# Patient Record
Sex: MALE | Race: OTHER | Employment: FULL TIME | URBAN - NONMETROPOLITAN AREA
[De-identification: names, ages, dates, MRNs, and addresses within clinical notes are randomized per-mention and may not be internally consistent; named-entity substitution may affect disease eponyms.]

---

## 2017-06-28 ENCOUNTER — INITIAL CONSULT (OUTPATIENT)
Dept: SURGERY | Age: 47
End: 2017-06-28
Payer: MEDICAID

## 2017-06-28 VITALS
DIASTOLIC BLOOD PRESSURE: 86 MMHG | RESPIRATION RATE: 12 BRPM | WEIGHT: 166 LBS | BODY MASS INDEX: 23.24 KG/M2 | TEMPERATURE: 97.1 F | HEIGHT: 71 IN | SYSTOLIC BLOOD PRESSURE: 112 MMHG | HEART RATE: 62 BPM

## 2017-06-28 DIAGNOSIS — K59.00 CONSTIPATION, UNSPECIFIED CONSTIPATION TYPE: ICD-10-CM

## 2017-06-28 DIAGNOSIS — R10.31 GROIN PAIN, RIGHT: Primary | ICD-10-CM

## 2017-06-28 LAB
-: NORMAL
AMORPHOUS: NORMAL
BACTERIA: NORMAL
CASTS UA: NORMAL /LPF (ref 0–2)
CRYSTALS, UA: NORMAL /HPF
EPITHELIAL CELLS UA: NORMAL /HPF (ref 0–5)
MUCUS: NORMAL
OTHER OBSERVATIONS UA: NORMAL
RBC UA: NORMAL /HPF (ref 0–4)
RENAL EPITHELIAL, UA: NORMAL /HPF
TRICHOMONAS: NORMAL
WBC UA: NORMAL /HPF (ref 0–4)
YEAST: NORMAL

## 2017-06-28 PROCEDURE — 99204 OFFICE O/P NEW MOD 45 MIN: CPT | Performed by: SURGERY

## 2017-07-06 ENCOUNTER — TELEPHONE (OUTPATIENT)
Dept: SURGERY | Age: 47
End: 2017-07-06

## 2017-09-19 ENCOUNTER — OFFICE VISIT (OUTPATIENT)
Dept: PRIMARY CARE CLINIC | Age: 47
End: 2017-09-19

## 2017-09-19 VITALS
SYSTOLIC BLOOD PRESSURE: 124 MMHG | DIASTOLIC BLOOD PRESSURE: 88 MMHG | HEIGHT: 71 IN | WEIGHT: 166 LBS | OXYGEN SATURATION: 95 % | HEART RATE: 66 BPM | TEMPERATURE: 99.6 F | BODY MASS INDEX: 23.24 KG/M2

## 2017-09-19 DIAGNOSIS — K52.9 GASTROENTERITIS: Primary | ICD-10-CM

## 2017-09-19 PROCEDURE — 99202 OFFICE O/P NEW SF 15 MIN: CPT | Performed by: NURSE PRACTITIONER

## 2017-09-19 RX ORDER — ONDANSETRON 4 MG/1
4 TABLET, FILM COATED ORAL EVERY 8 HOURS PRN
Qty: 15 TABLET | Refills: 0 | Status: SHIPPED | OUTPATIENT
Start: 2017-09-19 | End: 2018-04-13

## 2017-09-19 ASSESSMENT — ENCOUNTER SYMPTOMS
NAUSEA: 1
CHANGE IN BOWEL HABIT: 1
RESPIRATORY NEGATIVE: 1
DIARRHEA: 1
ABDOMINAL PAIN: 1
VOMITING: 1

## 2018-04-13 ENCOUNTER — OFFICE VISIT (OUTPATIENT)
Dept: FAMILY MEDICINE CLINIC | Age: 48
End: 2018-04-13
Payer: COMMERCIAL

## 2018-04-13 VITALS
HEIGHT: 71 IN | WEIGHT: 177.2 LBS | RESPIRATION RATE: 16 BRPM | HEART RATE: 62 BPM | DIASTOLIC BLOOD PRESSURE: 78 MMHG | TEMPERATURE: 98.2 F | BODY MASS INDEX: 24.81 KG/M2 | SYSTOLIC BLOOD PRESSURE: 120 MMHG

## 2018-04-13 DIAGNOSIS — Z13.1 SCREENING FOR DIABETES MELLITUS (DM): ICD-10-CM

## 2018-04-13 DIAGNOSIS — M25.521 RIGHT ELBOW PAIN: Primary | ICD-10-CM

## 2018-04-13 DIAGNOSIS — M77.11 LATERAL EPICONDYLITIS OF RIGHT ELBOW: ICD-10-CM

## 2018-04-13 DIAGNOSIS — Z13.220 SCREENING CHOLESTEROL LEVEL: ICD-10-CM

## 2018-04-13 DIAGNOSIS — Z12.5 SCREENING FOR PROSTATE CANCER: ICD-10-CM

## 2018-04-13 PROCEDURE — 99204 OFFICE O/P NEW MOD 45 MIN: CPT | Performed by: NURSE PRACTITIONER

## 2018-04-13 RX ORDER — DICLOFENAC POTASSIUM 25 MG/1
CAPSULE, LIQUID FILLED ORAL
Qty: 60 CAPSULE | Refills: 0 | Status: SHIPPED | OUTPATIENT
Start: 2018-04-13 | End: 2019-03-25

## 2018-04-13 RX ORDER — DICLOFENAC POTASSIUM 25 MG/1
CAPSULE, LIQUID FILLED ORAL
Qty: 60 CAPSULE | Refills: 0 | Status: SHIPPED | OUTPATIENT
Start: 2018-04-13 | End: 2018-04-13 | Stop reason: SDUPTHER

## 2018-04-13 RX ORDER — METHYLPREDNISOLONE 4 MG/1
TABLET ORAL
Qty: 1 KIT | Refills: 0 | Status: SHIPPED | OUTPATIENT
Start: 2018-04-13 | End: 2018-04-19

## 2018-04-13 RX ORDER — METHYLPREDNISOLONE 4 MG/1
TABLET ORAL
Qty: 1 KIT | Refills: 0 | Status: SHIPPED | OUTPATIENT
Start: 2018-04-13 | End: 2018-04-13 | Stop reason: SDUPTHER

## 2018-04-13 ASSESSMENT — ENCOUNTER SYMPTOMS
ABDOMINAL PAIN: 0
SHORTNESS OF BREATH: 0
NAUSEA: 0
COUGH: 0

## 2018-04-13 ASSESSMENT — PATIENT HEALTH QUESTIONNAIRE - PHQ9
2. FEELING DOWN, DEPRESSED OR HOPELESS: 0
SUM OF ALL RESPONSES TO PHQ QUESTIONS 1-9: 0
SUM OF ALL RESPONSES TO PHQ9 QUESTIONS 1 & 2: 0
1. LITTLE INTEREST OR PLEASURE IN DOING THINGS: 0

## 2018-04-17 ENCOUNTER — HOSPITAL ENCOUNTER (OUTPATIENT)
Age: 48
Discharge: HOME | End: 2018-04-17
Payer: COMMERCIAL

## 2018-04-17 ENCOUNTER — TELEPHONE (OUTPATIENT)
Dept: FAMILY MEDICINE CLINIC | Age: 48
End: 2018-04-17

## 2018-04-17 ENCOUNTER — HOSPITAL ENCOUNTER (OUTPATIENT)
Age: 48
Discharge: HOME | End: 2018-04-19
Payer: COMMERCIAL

## 2018-04-17 ENCOUNTER — HOSPITAL ENCOUNTER (OUTPATIENT)
Dept: GENERAL RADIOLOGY | Age: 48
Discharge: HOME | End: 2018-04-19
Payer: COMMERCIAL

## 2018-04-17 DIAGNOSIS — Z12.5 SCREENING FOR PROSTATE CANCER: ICD-10-CM

## 2018-04-17 DIAGNOSIS — Z13.1 SCREENING FOR DIABETES MELLITUS (DM): ICD-10-CM

## 2018-04-17 DIAGNOSIS — M25.521 RIGHT ELBOW PAIN: ICD-10-CM

## 2018-04-17 DIAGNOSIS — Z13.220 SCREENING CHOLESTEROL LEVEL: ICD-10-CM

## 2018-04-17 LAB
CHOLESTEROL/HDL RATIO: 4.8
CHOLESTEROL: 247 MG/DL
GLUCOSE FASTING: 98 MG/DL (ref 70–99)
HDLC SERPL-MCNC: 51 MG/DL
LDL CHOLESTEROL: 176 MG/DL (ref 0–130)
PROSTATE SPECIFIC ANTIGEN: 1.45 UG/L
TRIGL SERPL-MCNC: 100 MG/DL
VLDLC SERPL CALC-MCNC: ABNORMAL MG/DL (ref 1–30)

## 2018-04-17 PROCEDURE — 82947 ASSAY GLUCOSE BLOOD QUANT: CPT

## 2018-04-17 PROCEDURE — 80061 LIPID PANEL: CPT

## 2018-04-17 PROCEDURE — G0103 PSA SCREENING: HCPCS

## 2018-04-17 PROCEDURE — 36415 COLL VENOUS BLD VENIPUNCTURE: CPT

## 2018-04-17 PROCEDURE — 73080 X-RAY EXAM OF ELBOW: CPT

## 2018-04-17 RX ORDER — IBUPROFEN 800 MG/1
800 TABLET ORAL EVERY 8 HOURS PRN
Qty: 60 TABLET | Refills: 1 | Status: SHIPPED | OUTPATIENT
Start: 2018-04-17 | End: 2019-03-25

## 2018-04-19 ENCOUNTER — OFFICE VISIT (OUTPATIENT)
Dept: FAMILY MEDICINE CLINIC | Age: 48
End: 2018-04-19
Payer: COMMERCIAL

## 2018-04-19 VITALS
RESPIRATION RATE: 12 BRPM | SYSTOLIC BLOOD PRESSURE: 110 MMHG | TEMPERATURE: 97.3 F | BODY MASS INDEX: 24.69 KG/M2 | HEART RATE: 66 BPM | DIASTOLIC BLOOD PRESSURE: 80 MMHG | WEIGHT: 177 LBS

## 2018-04-19 DIAGNOSIS — M77.11 RIGHT LATERAL EPICONDYLITIS: Primary | ICD-10-CM

## 2018-04-19 DIAGNOSIS — E78.2 MIXED HYPERLIPIDEMIA: ICD-10-CM

## 2018-04-19 PROCEDURE — 99214 OFFICE O/P EST MOD 30 MIN: CPT | Performed by: NURSE PRACTITIONER

## 2018-04-19 ASSESSMENT — ENCOUNTER SYMPTOMS
COUGH: 0
SHORTNESS OF BREATH: 0
NAUSEA: 0
ABDOMINAL PAIN: 0

## 2018-04-20 DIAGNOSIS — M25.521 RIGHT ELBOW PAIN: Primary | ICD-10-CM

## 2018-04-23 DIAGNOSIS — M25.521 RIGHT ELBOW PAIN: Primary | ICD-10-CM

## 2019-04-08 PROBLEM — R10.13 EPIGASTRIC PAIN: Status: ACTIVE | Noted: 2019-04-08

## 2019-04-08 PROBLEM — K44.9 HIATAL HERNIA: Status: ACTIVE | Noted: 2019-04-08

## 2019-04-08 PROBLEM — K21.9 GERD (GASTROESOPHAGEAL REFLUX DISEASE): Status: ACTIVE | Noted: 2019-04-08

## 2019-05-13 ENCOUNTER — TELEPHONE (OUTPATIENT)
Dept: GASTROENTEROLOGY | Age: 49
End: 2019-05-13

## 2019-05-13 ENCOUNTER — OFFICE VISIT (OUTPATIENT)
Dept: GASTROENTEROLOGY | Age: 49
End: 2019-05-13
Payer: COMMERCIAL

## 2019-05-13 VITALS
WEIGHT: 184 LBS | DIASTOLIC BLOOD PRESSURE: 86 MMHG | SYSTOLIC BLOOD PRESSURE: 139 MMHG | HEART RATE: 51 BPM | BODY MASS INDEX: 25.67 KG/M2

## 2019-05-13 DIAGNOSIS — R10.13 DYSPEPSIA: Primary | ICD-10-CM

## 2019-05-13 DIAGNOSIS — K21.9 GASTROESOPHAGEAL REFLUX DISEASE, ESOPHAGITIS PRESENCE NOT SPECIFIED: ICD-10-CM

## 2019-05-13 DIAGNOSIS — Z12.11 SCREEN FOR COLON CANCER: ICD-10-CM

## 2019-05-13 PROCEDURE — 99244 OFF/OP CNSLTJ NEW/EST MOD 40: CPT | Performed by: INTERNAL MEDICINE

## 2019-05-13 RX ORDER — RANITIDINE 150 MG/1
150 TABLET ORAL 2 TIMES DAILY
Qty: 60 TABLET | Refills: 3 | Status: SHIPPED | OUTPATIENT
Start: 2019-05-13 | End: 2020-02-21

## 2019-05-13 RX ORDER — PANTOPRAZOLE SODIUM 20 MG/1
20 TABLET, DELAYED RELEASE ORAL DAILY
Qty: 30 TABLET | Refills: 3 | Status: SHIPPED | OUTPATIENT
Start: 2019-05-13 | End: 2020-02-21

## 2019-05-13 ASSESSMENT — ENCOUNTER SYMPTOMS
RECTAL PAIN: 0
BLOOD IN STOOL: 0
CONSTIPATION: 0
VOMITING: 0
WHEEZING: 0
TROUBLE SWALLOWING: 0
CHOKING: 0
ABDOMINAL DISTENTION: 0
ANAL BLEEDING: 0
COUGH: 0
NAUSEA: 0
ABDOMINAL PAIN: 0
DIARRHEA: 0

## 2019-05-13 NOTE — TELEPHONE ENCOUNTER
Pt is going to call us back in regards to scheduling. [facility]. He has two dates 6/11/19 & 7/9/19 he will call back and let us know what date is better for him.

## 2019-05-13 NOTE — PROGRESS NOTES
Reason for Referral:   Fcocalliemervin Potts, APRN - CNP  [address] [facility]  [address] [facility],[address]  [place], [address] [facility]    Chief Complaint   Patient presents with    Abdominal Pain     Patient is new to clinic today. He is here for epigastric pain in which he has had for several years. He also states he needs a colon screen. He states he had a colonoscopy about 8 yrs ago along with an EGD. He states the only findings were a hiatal hernia. Thsi was done in [place].  Gastroesophageal Reflux     Patient states he has not been taking his nexium because it gives him a bad headache. He states he has taken Zantac and this helps. HISTORY OF PRESENT ILLNESS: Mr.Ben Miguel Pineda is a 44 y.o. male , referred for evaluation of epigastric pain, GERD. Patient is here for the 1st time he used to be in a different state he is due to have his colonoscopy repeated he said, and he also due to have an EGD a cause is been having epigastric pain and discomfort and dyspepsia type syndrome with indigestion. He does have acid reflux he tried H2 blocker without any help and he was tried on Nexium and Nexium gave him headache. He denied any odynophagia or dysphagia but after EEG feel pressure and heaviness in his epigastric area. Denied any bleeding denied any weight loss denied any nausea vomiting or fever denied any recent change in his bowel habits  Had a colonoscopy more than 8 years ago when he was told to repeat in 5 years he does not think he had polyps but she's not sure      Past Medical,Family, and Social History reviewed and does contribute to the patient presentingcondition. Patient's PMH/PSH,SH,PSYCH Hx, MEDs, ALLERGIES, and ROS were all reviewed and updated in the appropriate sections. PAST MEDICAL HISTORY:  Past Medical History:   Diagnosis Date    GERD (gastroesophageal reflux disease)     Hiatal hernia        No past surgical history on file.     CURRENT MEDICATIONS:    Current Outpatient Medications:    positives and negatives were enumerated above in the history of present illness. All other reviewed systems / symptoms were negative. Review of Systems   Constitutional: Negative for appetite change, fatigue and unexpected weight change. HENT: Negative for trouble swallowing. Respiratory: Negative for cough, choking and wheezing. Cardiovascular: Negative for chest pain, palpitations and leg swelling. Gastrointestinal: Negative for abdominal distention, abdominal pain, anal bleeding, blood in stool, constipation, diarrhea, nausea, rectal pain and vomiting. Genitourinary: Negative for difficulty urinating. Allergic/Immunologic: Negative for environmental allergies and food allergies. Neurological: Negative for dizziness, weakness, light-headedness, numbness and headaches. Hematological: Does not bruise/bleed easily. Psychiatric/Behavioral: Negative for sleep disturbance. The patient is not nervous/anxious. LABORATORY DATA: Reviewed  Lab Results   Component Value Date    WBC 5.3 04/05/2019    HGB 15.3 04/05/2019    HCT 44.3 04/05/2019    MCV 88 04/05/2019     04/05/2019     04/05/2019    K 4.2 04/05/2019     04/05/2019    CO2 27 04/05/2019    BUN 17 04/05/2019    CREATININE 0.97 04/05/2019    LABALBU 4.2 04/05/2019    BILITOT 0.8 04/05/2019    ALKPHOS 62 04/05/2019    AST 41 04/05/2019    ALT 69 04/05/2019         Lab Results   Component Value Date    RBC 5.06 04/05/2019    HGB 15.3 04/05/2019    MCV 88 04/05/2019    MCH 30.3 04/05/2019    MCHC 34.6 04/05/2019    RDW 13.6 04/05/2019    MPV 9.3 04/05/2019    BASOPCT 0.4 04/05/2019    LYMPHSABS 2.0 04/05/2019    MONOSABS 0.5 04/05/2019    NEUTROABS 2.6 04/05/2019    EOSABS 0.1 04/05/2019    BASOSABS 0.0 04/05/2019         DIAGNOSTIC TESTING:     No results found. /86   Pulse 51   Wt 184 lb (83.5 kg)   BMI 25.67 kg/m²     PHYSICAL EXAMINATION: Vital signs reviewed per the nursing documentation.        Body mass index is 25.67 kg/m². Physical Exam   Constitutional: He is oriented to person, place, and time. He appears well-developed and well-nourished. No distress. HENT:   Head: Normocephalic and atraumatic. Mouth/Throat: Oropharynx is clear and moist.   Eyes: Pupils are equal, round, and reactive to light. No scleral icterus. Neck: Normal range of motion. Neck supple. No JVD present. No tracheal deviation present. No thyromegaly present. Cardiovascular: Normal rate, regular rhythm, normal heart sounds and intact distal pulses. No murmur heard. Pulmonary/Chest: Effort normal and breath sounds normal. No respiratory distress. He has no wheezes. Abdominal: Soft. Bowel sounds are normal. He exhibits no distension and no mass. There is no tenderness. There is no rebound and no guarding. Musculoskeletal: Normal range of motion. He exhibits no edema or tenderness. Neurological: He is alert and oriented to person, place, and time. He has normal reflexes. Skin: Skin is warm. No rash noted. He is not diaphoretic. No erythema. No pallor. He is not diaphoretic   Psychiatric: He has a normal mood and affect. His behavior is normal. Judgment and thought content normal.   Nursing note and vitals reviewed. IMPRESSION: Mr. Dav Green is a 44 y.o. male with      Diagnosis Orders   1. Dyspepsia  EGD   2. Gastroesophageal reflux disease, esophagitis presence not specified  EGD   3. Screen for colon cancer  COLONOSCOPY W/ OR W/O BIOPSY     Will proceed with the EGD and colonoscopy  Given prescription for Protonix  Diet/life style/natural hx /complication of the dx were all explained in details   Past medical, past surgical, social history, psychiatric history, medications or allergies, all reviewed and  updated      Spent 30 minutes providing patient education and counseling. Thank you for allowing me to participate in the care of Mr. Dav Green.  For any further questions please do not hesitate to contact me.      I have reviewed and agree with the MA/LPN ROS. Note is dictated utilizing voice recognition software. Unfortunately this leads to occasional typographical errors. Please contact our office if you have any questions.     Lynn Pepper MD  [facility]  O: #[phone]

## 2019-08-19 ENCOUNTER — TELEPHONE (OUTPATIENT)
Dept: GASTROENTEROLOGY | Age: 49
End: 2019-08-19

## 2020-02-24 ENCOUNTER — HOSPITAL ENCOUNTER (OUTPATIENT)
Age: 50
Setting detail: SPECIMEN
Discharge: HOME | End: 2020-02-24
Payer: COMMERCIAL

## 2020-02-24 LAB
ABSOLUTE EOS #: 0.12 K/UL (ref 0–0.44)
ABSOLUTE IMMATURE GRANULOCYTE: 0.03 K/UL (ref 0–0.3)
ABSOLUTE LYMPH #: 2.22 K/UL (ref 1.1–3.7)
ABSOLUTE MONO #: 0.62 K/UL (ref 0.1–1.2)
ALBUMIN SERPL-MCNC: 4.2 G/DL (ref 3.5–5.2)
ALBUMIN/GLOBULIN RATIO: 1.2 (ref 1–2.5)
ALP BLD-CCNC: 82 U/L (ref 40–129)
ALT SERPL-CCNC: 101 U/L (ref 5–41)
ANION GAP SERPL CALCULATED.3IONS-SCNC: 13 MMOL/L (ref 9–17)
AST SERPL-CCNC: 56 U/L
BASOPHILS # BLD: 0 % (ref 0–2)
BASOPHILS ABSOLUTE: <0.03 K/UL (ref 0–0.2)
BILIRUB SERPL-MCNC: 0.54 MG/DL (ref 0.3–1.2)
BUN BLDV-MCNC: 17 MG/DL (ref 6–20)
BUN/CREAT BLD: ABNORMAL (ref 9–20)
CALCIUM SERPL-MCNC: 9.6 MG/DL (ref 8.6–10.4)
CHLORIDE BLD-SCNC: 102 MMOL/L (ref 98–107)
CHOLESTEROL, FASTING: 231 MG/DL
CHOLESTEROL/HDL RATIO: 5
CO2: 25 MMOL/L (ref 20–31)
CREAT SERPL-MCNC: 0.88 MG/DL (ref 0.7–1.2)
DIFFERENTIAL TYPE: ABNORMAL
EOSINOPHILS RELATIVE PERCENT: 2 % (ref 1–4)
GFR AFRICAN AMERICAN: >60 ML/MIN
GFR NON-AFRICAN AMERICAN: >60 ML/MIN
GFR SERPL CREATININE-BSD FRML MDRD: ABNORMAL ML/MIN/{1.73_M2}
GFR SERPL CREATININE-BSD FRML MDRD: ABNORMAL ML/MIN/{1.73_M2}
GLUCOSE BLD-MCNC: 93 MG/DL (ref 70–99)
HCT VFR BLD CALC: 47.9 % (ref 40.7–50.3)
HDLC SERPL-MCNC: 46 MG/DL
HEMOGLOBIN: 15.4 G/DL (ref 13–17)
IMMATURE GRANULOCYTES: 1 %
LDL CHOLESTEROL: 151 MG/DL (ref 0–130)
LYMPHOCYTES # BLD: 38 % (ref 24–43)
MCH RBC QN AUTO: 29.3 PG (ref 25.2–33.5)
MCHC RBC AUTO-ENTMCNC: 32.2 G/DL (ref 28.4–34.8)
MCV RBC AUTO: 91.1 FL (ref 82.6–102.9)
MONOCYTES # BLD: 11 % (ref 3–12)
NRBC AUTOMATED: 0 PER 100 WBC
PDW BLD-RTO: 12.9 % (ref 11.8–14.4)
PLATELET # BLD: 277 K/UL (ref 138–453)
PLATELET ESTIMATE: ABNORMAL
PMV BLD AUTO: 11.1 FL (ref 8.1–13.5)
POTASSIUM SERPL-SCNC: 4.9 MMOL/L (ref 3.7–5.3)
PROSTATE SPECIFIC ANTIGEN: 1.94 UG/L
RBC # BLD: 5.26 M/UL (ref 4.21–5.77)
RBC # BLD: ABNORMAL 10*6/UL
SEG NEUTROPHILS: 48 % (ref 36–65)
SEGMENTED NEUTROPHILS ABSOLUTE COUNT: 2.84 K/UL (ref 1.5–8.1)
SODIUM BLD-SCNC: 140 MMOL/L (ref 135–144)
TOTAL PROTEIN: 7.7 G/DL (ref 6.4–8.3)
TRIGLYCERIDE, FASTING: 172 MG/DL
VLDLC SERPL CALC-MCNC: ABNORMAL MG/DL (ref 1–30)
WBC # BLD: 5.9 K/UL (ref 3.5–11.3)
WBC # BLD: ABNORMAL 10*3/UL

## 2020-02-28 ENCOUNTER — HOSPITAL ENCOUNTER (OUTPATIENT)
Dept: PHYSICAL THERAPY | Age: 50
Setting detail: THERAPIES SERIES
Discharge: HOME | End: 2020-02-28
Payer: COMMERCIAL

## 2020-02-28 PROCEDURE — 97161 PT EVAL LOW COMPLEX 20 MIN: CPT

## 2020-02-28 PROCEDURE — 97110 THERAPEUTIC EXERCISES: CPT

## 2020-02-28 NOTE — CONSULTS
[]      [address] [facility]              [address] [place] [address] [address]              Phone: [phone]              Fax: [phone]         Physical Therapy Spine Evaluation    Date:  2020  Patient: Karlene Dakin  : 1970  MRN: [id number]  Physician: MARIELLE Layne - CNP   Insurance: [employer]  Medical Diagnosis: Chronic neck pain  Rehab Codes: M54.2, G89.29   Onset Date: 2019  Next 's appt. : 20      Subjective:   CC/HPI:  Pt reports to PT with L sided neck pain. Pt states that this started about 10 months ago, noting that he is unsure of the cause, but he woke up and had pain. Pt denies any numbness or tingling. Pt reports that he has most of his pain when resting or laying down. Pt reports that this is affecting his sleep. Pt reports that he sleeps with one pillow, but has tried several combinations. Pt reports that he has been to a chiropractor with no relief noticed. Pt states that he feels like it is the muscle that is bothering him at this point. Pt reports that he has the most pain when he turns his head side to side. PMHx:   [] Unremarkable               [x] Refer to full medical chart  In EPIC     Tests: [x] X-Ray: Per pt X-rays are normal   [] MRI:   [] Other:    Medications: [x] Refer to full medical record [] None [] Other:  Allergies:      [x] Refer to full medical record [] None [] Other:      Function:  Hand Dominance  [x] Right  [] Left  Marital Status    Home type    Stairs from outside    [address] inside    [employer]   Job status Employed   Work Activities/duties  Move cars, detail cars   Recreational Activities --       Pain present?  No   Location L neck   Pain Rating currently 0/10   Pain at worse 8/10   Pain at best 0/10   Description of pain Constant   Altered Sensation None   What makes it worse Turning head side to side   What makes it better Biofreeze   Symptom progression Gotten worse   Sleep Sleep affected, Good  [] Fair  [] Poor   Suggested Professional Referral:  [x] No  [] Yes:  Barriers to Goal Achievement[de-identified]  [x] No  [] Yes:  Domestic Concerns:  [x] No  [] Yes:    Pt. Education:  [x] Plans/Goals, Risks/Benefits discussed  [x] Home exercise program  Method of Education: [x] Verbal  [x] Demo  [x] Written  Comprehension of Education:  [x] Verbalizes understanding. [x] Demonstrates understanding. [x] Needs Review. [] Demonstrates/verbalizes understanding of HEP/Ed previously given. Treatment Plan:  [x] Therapeutic Exercise   [] Electrical Stimulation  [x] Manual Therapy     [] Lumbar/Cervical Traction  [] Neuromuscular Re-education [x] Cold/hotpack [] Iontophoresis: 4 mg/mL  [x] Instruction in HEP             Dexamethasone Sodium  [] Gait Training           Phosphate 40-80 mAmin  [] Vasocompression: Gerri Prater    [] Other:    []  Medication allergies reviewed for use of    Dexamethasone Sodium Phosphate 4mg/ml     with iontophoresis treatments. Pt is not allergic.     Frequency:  2 x/week for 12 visits      Todays Treatment:    Exercises:  Exercise  Neck Reps/ Time Weight/ Level Comments   UT S 3x30\"     Levator S 3x30\"     Cervical flexion S 3x30\"                       Scap squeezes 20x5\"     Chin tucks                                                            Other: Rockblades to L UT    Specific Instructions for next treatment: Continue tx per POC, Rockblades to L UT    Evaluation Complexity:  History (Personal factors, comorbidities) [] 0 [x] 1-2 [] 3+   Exam (limitations, restrictions) [x] 1-2 [] 3 [] 4+   Clinical presentation (progression) [x] Stable [] Evolving  [] Unstable   Decision Making [x] Low [] Moderate [] High    [x] Low Complexity [] Moderate Complexity [] High Complexity       Treatment Charges: Mins Units   [x] Evaluation       [x]  Low       []  Moderate       []  High 40 1   []  Modalities     [x]  Ther Exercise 10 1   []  Manual Therapy     []  Ther Activities     []  Aquatics     [] Vasocompression     []  Other       TOTAL TREATMENT TIME: 50    Time in: 3217      Time out: 6625    Electronically signed by: Ravin Scales PT    Physician Signature:________________________________Date:__________________  By signing above or cosigning this note, I have reviewed this plan of care and certify a need for medically necessary rehabilitation services.      *PLEASE SIGN ABOVE AND FAX BACK ALL PAGES*

## 2020-03-03 ENCOUNTER — HOSPITAL ENCOUNTER (OUTPATIENT)
Dept: PHYSICAL THERAPY | Age: 50
Setting detail: THERAPIES SERIES
Discharge: HOME | End: 2020-03-03
Payer: COMMERCIAL

## 2020-03-03 PROCEDURE — 97110 THERAPEUTIC EXERCISES: CPT

## 2020-03-03 PROCEDURE — 97140 MANUAL THERAPY 1/> REGIONS: CPT

## 2020-03-03 NOTE — PROGRESS NOTES
[facility]   [address] [facility] [address]   Phone: [phone]   Fax: [phone]    Physical Therapy Daily Treatment Note      Date:  3/3/2020  Patient Name:  Yue Tineo    :  1970  MRN: [id number]  Physician: MARIELLE Ramires - FILI                               Insurance: [facility] [place] [address]  Medical Diagnosis: Chronic neck pain                     Rehab Codes: M54.2, G89.29   Onset Date: 2019                     Next 's appt. : 20  Visit# / total visits: 2 Cancels/No Shows: 0/0    Subjective:  Patient reports compliance with stretches at home, still had very poor sleep last night. Pain:  [x] Yes  [] No Location: L UT/neck Pain Rating: (0-10 scale) 4/10  Pain altered Tx:  [] No  [] Yes  Action:  Comments:    Objective:  Modalities:   Precautions:  Exercises:  Exercise  Neck Reps/ Time Weight/ Level Comments   UT S 3x30\"       Levator S 3x30\"       Cervical flexion S 3x30\"       Cervical ROM  10x2 ea    seated                       Scap squeezes 20x5\"       Chin tucks 20x                 Pulls downs/Rows  20x R2     Horizontal Abd  20x R2                                                       Manual    25'     Other: Kylah to L UT     Specific Instructions for next treatment: Continue tx per Kylah ARMENTA to L UT      Assessment: Additional therex added this date with patient noting soreness and \"pinching\" in neck throughout session. Continued with Rockblade to L UT and neck to help with pain relief with none noted at end of therapy. Ended with 5 minutes of cold pack on L UT and Neck to prevent excess swelling and pain. [] Progressing toward goals. [] No change. [] Other:        STG: (to be met in 6 treatments)  1. ? Pain: Pt to decrease pain levels to 4/10 with all activities to ease ability to complete all ADLs and work tasks.   2. ? ROM: Increase flexibility throughout cervical spine to improve alignment and decrease pain with motion. 3. Independent with Home Exercise Programs     LTG: (to be met in 12 treatments)  1. Improve score of NDI from 34% impaired to <24% impaired to improve pt's ability to complete ADLS and work tasks. 2. Decrease pain to 0/10.   3. ? Function: Pt will report being able to sleep for 7 hours to allow him to be better rested for day. 4. ? Strength: Improve scapular stabilizer strength to 5/5 to help improve pt's posture for better alignment.     Patient goals: \"To feel better\"    Pt. Education:  [x] Yes  [] No  [] Reviewed Prior HEP/Ed  Method of Education: [x] Verbal  [] Demo  [] Written  Comprehension of Education:  [x] Verbalizes understanding. [] Demonstrates understanding. [] Needs review. [] Demonstrates/verbalizes HEP/Ed previously given. Plan: [x] Continue per plan of care.    [] Other:      Treatment Charges: Mins Units   []  Modalities     [x]  Ther Exercise 15 1   [x]  Manual Therapy 25 2   []  Ther Activities     []  Aquatics     []  Neuromuscular     [] Vasocompression     [] Gait Training     [] Dry needling        [] 1 or 2 muscles        [] 3 or more muscles     [x]  Other- Cold Pack  5 0   Total Treatment time 45 3     Time In:0900am            Time Out: 5843GW    Electronically signed by:  Nely Gonsalez PTA

## 2020-03-06 ENCOUNTER — HOSPITAL ENCOUNTER (OUTPATIENT)
Dept: PHYSICAL THERAPY | Age: 50
Setting detail: THERAPIES SERIES
Discharge: HOME | End: 2020-03-06
Payer: COMMERCIAL

## 2020-03-06 PROCEDURE — 97110 THERAPEUTIC EXERCISES: CPT

## 2020-03-06 PROCEDURE — 97140 MANUAL THERAPY 1/> REGIONS: CPT

## 2020-03-06 NOTE — PROGRESS NOTES
treatments)  1. Improve score of NDI from 34% impaired to <24% impaired to improve pt's ability to complete ADLS and work tasks. 2. Decrease pain to 0/10.   3. ? Function: Pt will report being able to sleep for 7 hours to allow him to be better rested for day. 4. ? Strength: Improve scapular stabilizer strength to 5/5 to help improve pt's posture for better alignment.     Patient goals: \"To feel better\"    Pt. Education:  [x] Yes  [] No  [] Reviewed Prior HEP/Ed  Method of Education: [x] Verbal  [] Demo  [] Written  Comprehension of Education:  [x] Verbalizes understanding. [] Demonstrates understanding. [] Needs review. [] Demonstrates/verbalizes HEP/Ed previously given. Plan: [x] Continue per plan of care.    [] Other:      Treatment Charges: Mins Units   []  Modalities     [x]  Ther Exercise 15 1   [x]  Manual Therapy 25 2   []  Ther Activities     []  Aquatics     []  Neuromuscular     [] Vasocompression     [] Gait Training     [] Dry needling        [] 1 or 2 muscles        [] 3 or more muscles     [x]  Other- Cold Pack  5 0   Total Treatment time 45 3     Time In:0900am            Time Out: 0706CD    Electronically signed by:  Estelle Strange PTA

## 2020-03-12 ENCOUNTER — HOSPITAL ENCOUNTER (OUTPATIENT)
Dept: PHYSICAL THERAPY | Age: 50
Setting detail: THERAPIES SERIES
Discharge: HOME | End: 2020-03-12
Payer: COMMERCIAL

## 2020-03-12 PROCEDURE — 97140 MANUAL THERAPY 1/> REGIONS: CPT

## 2020-03-12 PROCEDURE — 97110 THERAPEUTIC EXERCISES: CPT

## 2020-03-12 NOTE — FLOWSHEET NOTE
ability to complete all ADLs and work tasks. 2. ? ROM: Increase flexibility throughout cervical spine to improve alignment and decrease pain with motion. 3. Independent with Home Exercise Programs     LTG: (to be met in 12 treatments)  1. Improve score of NDI from 34% impaired to <24% impaired to improve pt's ability to complete ADLS and work tasks. 2. Decrease pain to 0/10.   3. ? Function: Pt will report being able to sleep for 7 hours to allow him to be better rested for day. 4. ? Strength: Improve scapular stabilizer strength to 5/5 to help improve pt's posture for better alignment.     Patient goals: \"To feel better\"    Pt. Education:  [x] Yes  [] No  [x] Reviewed Prior HEP/Ed  Method of Education: [x] Verbal  [x] Demo  [] Written  Comprehension of Education:  [x] Verbalizes understanding. [x] Demonstrates understanding. [x] Needs review. [] Demonstrates/verbalizes HEP/Ed previously given. Plan: [x] Continue per plan of care. [] Other:      Treatment Charges: Mins Units   []  Modalities     [x]  Ther Exercise 32 2   [x]  Manual Therapy 10 1   []  Ther Activities     []  Aquatics     []  Neuromuscular     [] Vasocompression     [] Gait Training     [] Dry needling        [] 1 or 2 muscles        [] 3 or more muscles     []  Other- Cold Pack      Total Treatment time 42 3     Time In: 0901            Time Out: 7602    Electronically signed by:   Marcello Foley, PT

## 2020-03-16 ENCOUNTER — HOSPITAL ENCOUNTER (OUTPATIENT)
Dept: PHYSICAL THERAPY | Age: 50
Setting detail: THERAPIES SERIES
Discharge: HOME | End: 2020-03-16
Payer: COMMERCIAL

## 2020-03-16 PROCEDURE — 97110 THERAPEUTIC EXERCISES: CPT

## 2020-03-16 PROCEDURE — 97140 MANUAL THERAPY 1/> REGIONS: CPT

## 2020-03-16 NOTE — FLOWSHEET NOTE
[facility]   [address]   Phone: [phone]   Fax: [phone]    Physical Therapy Daily Treatment Note      Date:  3/16/2020  Patient Name:  Raffaele Mao    :  1970  MRN: [id number]  Physician: MARIELLE Barillas CNP                               Insurance: [employer] Diagnosis: Chronic neck pain                     Rehab Codes: M54.2, G89.29   Onset Date: 2019                     Next 's appt. : 20  Visit# / total visits:  Cancels/No Shows: 0/0    Subjective:  Patient reports increased soreness from last session. Pain:  [x] Yes  [] No  Location: L UT/neck  Pain Rating: (0-10 scale) 7/10  Pain altered Tx:  [x] No  [] Yes  Action:  Comments:    Objective:  Modalities:  Precautions:  Exercises:  Exercise  Neck Reps/ Time Weight/ Level Comments    UT S 3x30\"     x   Levator S 3x30\"     x   Cervical flexion S 3x30\"     x   Cervical ROM  10x5\"    seated x    Doorway lat S 3x30\"      x              Scap squeezes 20x5\"     x   Chin tucks 20x                   Ext/Rows  3x10 Blue   x   Horizontal Abd  3x10 Red   x                                                       Manual    25'      Other: Rockblades to L UT      Specific Instructions for next treatment: Continue tx per Kylah ARMENTA to L UT      Assessment:     [x] Progressing toward goals. [] No change. [x] Other: Continued with most recent progressions with slight increased pain during shoulder abduction. STG: (to be met in 6 treatments)  1. ? Pain: Pt to decrease pain levels to 4/10 with all activities to ease ability to complete all ADLs and work tasks. 2. ? ROM: Increase flexibility throughout cervical spine to improve alignment and decrease pain with motion. 3. Independent with Home Exercise Programs     LTG: (to be met in 12 treatments)  1.  Improve score of NDI from 34% impaired to <24% impaired to improve pt's ability to complete ADLS and work tasks.  2. Decrease pain to 0/10.   3. ? Function: Pt will report being able to sleep for 7 hours to allow him to be better rested for day. 4. ? Strength: Improve scapular stabilizer strength to 5/5 to help improve pt's posture for better alignment.     Patient goals: \"To feel better\"    Pt. Education:  [x] Yes  [] No  [x] Reviewed Prior HEP/Ed  Method of Education: [x] Verbal  [x] Demo  [] Written  Comprehension of Education:  [x] Verbalizes understanding. [x] Demonstrates understanding. [x] Needs review. [] Demonstrates/verbalizes HEP/Ed previously given. Plan: [x] Continue per plan of care.    [] Other:      Treatment Charges: Mins Units   []  Modalities     [x]  Ther Exercise 32 2   [x]  Manual Therapy 10 1   []  Ther Activities     []  Aquatics     []  Neuromuscular     [] Vasocompression     [] Gait Training     [] Dry needling        [] 1 or 2 muscles        [] 3 or more muscles     []  Other- Cold Pack      Total Treatment time 42 3     Time In: 0550            Time Out: 5150    Electronically signed by:  Catherine Vidales PTA

## 2020-03-17 ENCOUNTER — HOSPITAL ENCOUNTER (OUTPATIENT)
Dept: PHYSICAL THERAPY | Age: 50
Setting detail: THERAPIES SERIES
Discharge: HOME | End: 2020-03-17
Payer: COMMERCIAL

## 2020-03-17 PROCEDURE — 97140 MANUAL THERAPY 1/> REGIONS: CPT

## 2020-03-17 PROCEDURE — 97110 THERAPEUTIC EXERCISES: CPT

## 2020-03-17 NOTE — FLOWSHEET NOTE
[address] [facility]   [address]   Phone: [phone]   Fax: [phone]    Physical Therapy Daily Treatment Note      Date:  3/17/2020  Patient Name:  Marilyn Santoro    :  1970  MRN: [id number]  Physician: Maikel Miles, MARIELLE - CNP                               Insurance: [employer] Qui[employer]ones Diagnosis: Chronic neck pain                     Rehab Codes: M54.2, G89.29   Onset Date: 2019                     Next 's appt. : 20  Visit# / total visits:  Cancels/No Shows: 0/0    Subjective:  Patient reports some relief of pain from last session especially after Ice. Pain:  [x] Yes  [] No  Location: L UT/neck  Pain Rating: (0-10 scale) 5/10  Pain altered Tx:  [x] No  [] Yes  Action:  Comments:    Objective:  Modalities:  Precautions:  Exercises:  Exercise  Neck Reps/ Time Weight/ Level Comments    UT S 3x30\"     x   Levator S 3x30\"     x   Cervical flexion S 3x30\"     x   Cervical ROM  10x5\"    seated x    Doorway lat S 3x30\"      x              Scap squeezes 20x5\"     x   Chin tucks 20x                   Ext/Rows  3x10 Blue   x   Horizontal Abd  3x10 Red   x                                                       Manual    25'      Other: Rockblades to L UT      Specific Instructions for next treatment: Continue tx per POC, Ajayades to L UT      Assessment:     [x] Progressing toward goals. [] No change. [x] Other: Continued with pain at end range during shoulder rows,and abduction. STG: (to be met in 6 treatments)  1. ? Pain: Pt to decrease pain levels to 4/10 with all activities to ease ability to complete all ADLs and work tasks. 2. ? ROM: Increase flexibility throughout cervical spine to improve alignment and decrease pain with motion. 3. Independent with Home Exercise Programs     LTG: (to be met in 12 treatments)  1.  Improve score of NDI from 34% impaired to <24% impaired to improve pt's ability to complete ADLS and work tasks.  2. Decrease pain to 0/10.   3. ? Function: Pt will report being able to sleep for 7 hours to allow him to be better rested for day. 4. ? Strength: Improve scapular stabilizer strength to 5/5 to help improve pt's posture for better alignment.     Patient goals: \"To feel better\"    Pt. Education:  [x] Yes  [] No  [x] Reviewed Prior HEP/Ed  Method of Education: [x] Verbal  [x] Demo  [] Written  Comprehension of Education:  [x] Verbalizes understanding. [x] Demonstrates understanding. [x] Needs review. [] Demonstrates/verbalizes HEP/Ed previously given. Plan: [x] Continue per plan of care.    [] Other:      Treatment Charges: Mins Units   []  Modalities     [x]  Ther Exercise 32 2   [x]  Manual Therapy 10 1   []  Ther Activities     []  Aquatics     []  Neuromuscular     [] Vasocompression     [] Gait Training     [] Dry needling        [] 1 or 2 muscles        [] 3 or more muscles     []  Other- Cold Pack  10 0   Total Treatment time 52 3     Time In: 0100            Time Out: 1049    Electronically signed by:  Jed Chu PTA

## 2020-03-23 ENCOUNTER — APPOINTMENT (OUTPATIENT)
Dept: PHYSICAL THERAPY | Age: 50
End: 2020-03-23
Payer: COMMERCIAL

## 2020-03-26 ENCOUNTER — APPOINTMENT (OUTPATIENT)
Dept: PHYSICAL THERAPY | Age: 50
End: 2020-03-26
Payer: COMMERCIAL

## 2020-07-06 ENCOUNTER — HOSPITAL ENCOUNTER (OUTPATIENT)
Dept: PHYSICAL THERAPY | Age: 50
Setting detail: THERAPIES SERIES
Discharge: HOME | End: 2020-07-06

## 2020-07-06 NOTE — DISCHARGE SUMMARY
[] [facility]) @ [facility]  [address] [facility] [address]  [place], [address] [address] [facility]  Phone [phone]  Fax [phone]    Physical Therapy Discharge Note    Date: 2020      Patient: Yoly Mojica  : 1970  MRN: [id number]    Physician: MARIELLE Carrera CNP                                Medical Diagnosis: Chronic neck pain                     Rehab Codes: M54.2, G89.29   Onset Date: 2019                       Next 's appt. : 20  Visit# : 6       Cancels/No Shows: 0/0  Date of initial visit: 20                   [] Patient recovered from conditions. Treatment goals were met. [] Patient received maximum benefit. No further therapy indicated at this time. [] Patient demonstrated improvement from condition with  ** Of  ** Short term goals met. []Patient demonstrated improvement from condition with **   Of **  Long term goals met. [] Patient to continue exercise/home instructions independently. [] Therapy interrupted due to:    [] Patient has 2 or more no shows/cancels, is discontinued per our policy. [] Patient has completed prescribed number of treatment sessions. [x] Other:  Pt has not been seen since clinic shut down for Covid, has not called back to schedule    Pain level at evaluation was       0/10 and at discharge was       Unknown/10    It Is My Understanding That The:  [] Patient returned to work. [] Patient demonstrated improved level of function. [] Patient returned to previous functional level.   [] Patient's current functional status is unknown due to no-shows  [x] Other: Unknown as pt has not been seen since clinic shutdown for Covid    Recommendations/Comments:                   Treatment Included:     [x] Therapeutic Exercise   01520  [] Iontophoresis: 4 mg/mL Dexamethasone Sodium Phosphate  mAmin  57768   [] Therapeutic Activity  84680 [] Vasopneumatic cold with compression  42747    [] Gait Training   [phone] [] Ultrasound   [id number]   [] Neuromuscular Re-education  [id number] [] Electrical Stimulation Unattended  65998   [x] Manual Therapy  70412 [] Electrical Stimulation Attended  [id number]   [x] Instruction in HEP  [] Lumbar/Cervical Traction  [id number]   [] Aquatic Therapy   [id number] [] Cold/hotpack    [] Massage   38416      [] Dry Needling, 1 or 2 muscles  48113   [] Biofeedback, first 15 minutes   56654  [] Biofeedback, additional 15 minutes   87793 [] Dry Needling, 3 or more muscles  06817                If you have any questions or concerns regarding this patient's care, please contact us. Thank you for your referral.      Electronically signed by:  Tin Gil PT

## 2020-07-15 ENCOUNTER — HOSPITAL ENCOUNTER (OUTPATIENT)
Dept: PHYSICAL THERAPY | Age: 50
Setting detail: THERAPIES SERIES
Discharge: HOME | End: 2020-07-15
Payer: COMMERCIAL

## 2020-07-15 PROCEDURE — 97110 THERAPEUTIC EXERCISES: CPT

## 2020-07-15 PROCEDURE — 97162 PT EVAL MOD COMPLEX 30 MIN: CPT

## 2020-07-16 NOTE — PROGRESS NOTES
[address] [facility]  [address] [facility]         Phone: [phone]       Fax: [phone]    Physical Therapy Lower Extremity Evaluation    Date:  7/15/2020  Patient: Damon Patrick  : 1970  MRN: [id number]  Physician: Abelino Platt   Insurance: [facility]  Medical Diagnosis: ankle pain M25.572  Rehab Codes: ankle pain, difficulty walking  Onset date: 20  Next Dr's appt.: PRN    Subjective: Patient presents to therapy with complaints of (B) ankle pain, (R) more than (L) following injury with forklift. Stated that he was hit with the front part of a forklift, his foot then got caught underneath the skid part, and then he fell forward. Had signficant swelling/bruising and has had general diffuse pain in the ankle and foot with more specific pain in the lateral compartment of the tibia and anterior compartment of the tibia since that time. XRAYS were inconclusive and showed no fracture per the patient. However, patient has had significant limitation with tolerance of ambulation, has noted significant swelling in the ankle/tibia, has noted significantly decreased motion of the ankle since that time as well as significant difficulty with walking, especially for a full day. CC: complaints of (B) diffuse pain, most significantly at anterior compartment of tibia and lateral compartment of tibia. HPI: injury 20    PMHx: [] Unremarkable [] Diabetes [] HTN  [] Pacemaker   [] MI/Heart Problems [] Cancer [] Arthritis [] Other:              [x] Refer to full medical chart  In EPIC   Tests: [x] X-Ray:see below [] MRI:  [] Other:   X-ray Ankle Right Minimum 3 Views    Addendum Date: 2020 Addendum:   ADDENDUM There is a dictation error, impression point 1 should state: No acute fracture.  No malalignment Workstation:[id number] Finalized by Mary Shaw MD on 2020 3:33 PM    Result Date: 2020  Narrative: XR ANKLE RT MIN 3 VWS HISTORY: Injury to ankle, pain. COMPARISON: None. IMPRESSION: 1. Acute fracture. No malalignment. Workstation:[id number] Finalized by Andrew Lowe MD on 6/21/2020 3:28 PM    X-ray Ankle Left Minimum 3 Views    Result Date: 6/21/2020  Narrative: History: injury and pain Study: Left Ankle Three view study. Comparison: Findings: No fracture, dislocation, or destructive osseous process is observed. No acute process. Impression: No acute abnormality. [id number] Finalized by Cristóbal Callejas MD on 6/21/2020 3:25 PM    Medications: [x] Refer to full medical record [] None [] Other:  Allergies:      [x] Refer to full medical record [] None [] Other:    Function:  Hand Dominance  [] Right  [] Left  Working:  [] Normal Duty  [] Light Duty  [] Off D/T Condition  [] Retired    [] Not Employed    []  Disability  [] Other:           Return to work:   Job/ADL Description:      Pain:  [x] Yes  [] No Location: (B) ankle pain, (R) more than (L) Pain Rating: (0-10 scale) 8/10  Pain altered Tx:  [x] Yes  [] No  Action:  Symptoms:  [] Improving [] Worsening [] Same  Better:  [] AM    [] PM    [] Sit    [] Rise/Sit    []Stand    [] Walk    [] Lying    [] Other:  Worse: [] AM    [] PM    [] Sit    [] Rise/Sit    []Stand    [] Walk    [] Lying    [] Bend                             [] Valsalva    [] Other:  Sleep: [] OK    [] Disturbed    Objective:    ROM  ° A/P STRENGTH    Left Right Left Right   Ankle DF Lacks 10 degrees  AROM, PROM neutral Lacks 10 degrees AROM, PROM neutral     PF 45 45     INV 35 35 pain in lateral compartment     EVER 10 10 pain in lateral compartment            UNABLE TO TEST MINIMAL resisted tested/strength testing from testing over edge of bed.     Girth testing:  Bimalleolar girth (R) 22.8cm; (L) 22.2  Figure 8 girth (R) 54cm, (L) 53cm  Tibial girth 25 cm proximal from lateral malleolus (R) 38th, (L) 37cm  MILD SWELLING in (R) versus (L) LE.    OBSERVATION No Deficit Deficit Not Tested Comments   Posture       Palpation [] [] [] MOD TTP diffusely in the ankle. Specifically more tender in (R) lateral compartment and peroneus tertius, longus, brevis tendon region as well as anterior compartment and into anterior tibialis tendon on (R). Sensation [] [] []    Edema [] [] []    Neurological [] [] []    Patellar Mobility [] [] []    Patellar Orientation [] [] []    Gait [] [] [] Analysis: Minimal decrease of WB with gait, states that he has significant decrease of WB and debility due to pain levels. Comments:  Assessment:  Patient with significant limitations with ankle AROM, all strength testing over edge of table with self limitations due to pain. Patient  reports significant debility with walking, WB, stairs. Clinically demonstrates mild decrease of WB during gait, mild swelling. Symptoms consistent with possible LE muscle strain of peroneal and anterior tibialis musculature without presence of fracture due to inconclusive XRAY testing. Problems:    [x] ? Pain: Pain 8/10 in (B) ankles/feet, especially (R) versus (L). [x] ? ROM: Limited with ankle ROM grossly significantly due to high pain levels    [x] ? Strength: Limited with basic seated strength testing due to high pain levels    [x] ? Function:  Limited with self report of walking, WB, stairs due to high pain levels. Ambulates with minimal decrease of WB on (R) (more affected) with minimal early heel off. [x] Edema:  Mild swelling in (R) ankle, foot, tibia with clinical girth measurements     STG: (to be met in 6 treatments)  1. ? Pain: Patient with improved pain levels to 2-3/10 with activity/exercises in clinic. 2. ? ROM: Improved ankle AROM to neutral AROM DF, 55 degrees PF, inversion 45 degrees, eversion 15 degrees  3. ? Strength: Able to better tolerate strength testing grossly; improved strength testing to 4 to 4+/5 with seated testing. 4. ?  Function: Able to ambulate 10 minutes with normalized gait with minimal deviation, able to WB for up to 30-45 minutes with minimal complaints of pain, able to ascend/descend 6\" stairs with minor pain levels. 5. Independent with Home Exercise Programs    LTG: (to be met in 12 treatments)  6. ? Pain: Patient with improved pain levels to 0-1/10 with activity/exercises in clinic.  7. ? ROM: Improved ankle AROM to 5 degrees AROM DF, 65 degrees PF, inversion 55 degrees, eversion 20 degrees  8. ? Strength: Able to better tolerate strength testing grossly; improved strength testing to 4+/5 with seated testing. Able to do (B) heel raise. Able to do SLS heel raise. 9. ? Function: Able to ambulate 10 minutes with normalized gait with minimal deviation, able to WB for up to 30-45 minutes with minimal complaints of pain, able to ascend/descend 6\" stairs with minor pain levels. 10. Independent with Home Exercise Programs               Patient goals: \"fix it\"  Functional Assessment Used:   Current Status: Score  Goal Status: Score    Evaluation Complexity:  History (Personal factors, comorbidities) [] 0 [x] 1-2 [] 3+   Exam (limitations, restrictions) [] 1-2 [x] 3 [] 4+   Clinical presentation (progression) [] Stable [x] Evolving  [] Unstable   Decision Making [] Low [x] Moderate [] High    [] Low Complexity [x] Moderate Complexity [] High Complexity     Rehab Potential:  [] Good  [x] Fair  [] Poor   Suggested Professional Referral:  [x] No  [] Yes:  Barriers to Goal Achievement[de-identified]  [x] No  [] Yes:  Domestic Concerns:  [x] No  [] Yes:    Pt. Education:  [x] Plans/Goals, Risks/Benefits discussed  [x] Home exercise program    Method of Education: [x] Verbal  [] Demo  [x] Written  Comprehension of Education:  [] Verbalizes understanding. [] Demonstrates understanding. [] Needs Review. [] Demonstrates/verbalizes understanding of HEP/Ed previously given.     Treatment Plan:  [x] Therapeutic Exercise   44790  [] Iontophoresis: 4 mg/mL Dexamethasone Sodium Phosphate  mAmin  59379   [] Therapeutic Activity  70140 [] Vasopneumatic cold with compression  [id number]    [] Gait Training   [phone] [] Ultrasound   [id number]   [x] Neuromuscular Re-education  P904348 [] Electrical Stimulation Unattended  23396   [x] Manual Therapy  31557 [] Electrical Stimulation Attended  [id number]   [x] Instruction in HEP  [] Lumbar/Cervical Traction  [id number]   [] Aquatic Therapy   [id number] [] Cold/hotpack    [] Massage   09554      [] Dry Needling, 1 or 2 muscles  27199   [] Biofeedback, first 15 minutes   20636  [] Biofeedback, additional 15 minutes   61375 [] Dry Needling, 3 or more muscles  67727       []  Medication allergies reviewed for use of    Dexamethasone Sodium Phosphate 4mg/ml     with iontophoresis treatments. Pt is not allergic. Frequency:  2-3 x/week for 12 visits        Todays Treatment:  Modalities:   Precautions:  Exercises:  Exercise Reps/ Time Weight/ Level Comments   Ankle AROM DF/PF/INV/EV 15x  HEP   Reviewed swelling mitigation/elevation/toe curls for HEP   HEP   Supine calf stretch  5 15\" HEP   Cross over inversion, DF stretch 5 15\" HEP         Other:    Specific Instructions for next treatment:    Treatment Charges: Mins Units   [x] Evaluation       []  Low       [x]  Moderate       []  High 30 1   []  Modalities     [x]  Ther Exercise 25 2   []  Manual Therapy     []  Ther Activities     []  Aquatics     []  Neuromuscular     []  Gait Training     []  Dry Needling           1-2 muscles     []  Dry Needling           3 or more          muscles     [] Vasocompression     []  Other 55        TOTAL TREATMENT TIME:     Time in:1600   Time LTD:2532    Electronically signed by: Rony Esteban PT        Physician Signature:________________________________Date:_____7/15/20_____________  By signing above or cosigning this note, I have reviewed this plan of care and certify a need for medically necessary rehabilitation services.      *PLEASE SIGN ABOVE AND FAX BACK ALL PAGES*

## 2020-07-27 ENCOUNTER — HOSPITAL ENCOUNTER (OUTPATIENT)
Dept: PHYSICAL THERAPY | Age: 50
Setting detail: THERAPIES SERIES
Discharge: HOME | End: 2020-07-27
Payer: COMMERCIAL

## 2020-07-27 PROCEDURE — 97140 MANUAL THERAPY 1/> REGIONS: CPT

## 2020-07-27 PROCEDURE — 97110 THERAPEUTIC EXERCISES: CPT

## 2020-07-27 NOTE — PROGRESS NOTES
[facility]   [address]   Phone: [phone]   Fax: [phone]    Physical Therapy Daily Treatment Note    Date:  2020  Patient Name:  Van Reyes    :  1970  MRN: [id number]  Physician: Harper Dick                       Insurance: [facility]  Medical Diagnosis: ankle pain M25.572                      Rehab Codes: ankle pain, difficulty walking  Onset date: 20                      Next Dr's appt.: PRN  Visit# / total visits: 2  Cancels/No Shows: 0/2    Subjective:  Patient continues to reports (B) calf and shin pain. States worse with WB and its difficult when trying to sleep because they feel to \"tight\". Pain:  [x] Yes  [] No Location: (B) ankle, (B) ant tib/fib, (B) calves  Pain Rating: (0-10 scale) 5-6/10 when sitting 8/10 with WB  Pain altered Tx:  [] No  [] Yes  Action:  Comments:    Objective:  Modalities:   Precautions:  Exercises:  Exercise Reps/ Time Weight/ Level Comments   Ankle AROM DF/PF/INV/EV 15x   HEP   Reviewed swelling mitigation/elevation/toe curls for HEP     HEP   Supine calf stretch  5 15\" HEP   Cross over inversion, DF stretch 5 15\" HEP         BAPS DF/PF/INV/EV 1' ea Level 2    Heel Raises  10x2      Ankle ABC 1x      Mini squat at table  10x      Standing calf stretch  30\"x2            Manual   25'   PROM to (B) ankles, STM with rockblade to (B) calves    Other:      Specific Instructions for next treatment:      Assessment: Patient reported pain with both mini squats and standing calf stretch, but reported \"tightness\" with all other exercises this date. Patient requested long amount of time spent on manual this date in order to assist with (B) calf, shin and ankle tightness, no reports of relief at end of session. [] Progressing toward goals. [] No change. [] Other:    [] Patient would continue to benefit from skilled physical therapy services in order to:     STG: (to be met in 6 treatments)  1. ? Pain: Patient with improved pain levels to 2-3/10 with activity/exercises in clinic. 2. ? ROM: Improved ankle AROM to neutral AROM DF, 55 degrees PF, inversion 45 degrees, eversion 15 degrees  3. ? Strength: Able to better tolerate strength testing grossly; improved strength testing to 4 to 4+/5 with seated testing. 4. ? Function: Able to ambulate 10 minutes with normalized gait with minimal deviation, able to WB for up to 30-45 minutes with minimal complaints of pain, able to ascend/descend 6\" stairs with minor pain levels. 5. Independent with Home Exercise Programs     LTG: (to be met in 12 treatments)  6. ? Pain: Patient with improved pain levels to 0-1/10 with activity/exercises in clinic.  7. ? ROM: Improved ankle AROM to 5 degrees AROM DF, 65 degrees PF, inversion 55 degrees, eversion 20 degrees  8. ? Strength: Able to better tolerate strength testing grossly; improved strength testing to 4+/5 with seated testing. Able to do (B) heel raise. Able to do SLS heel raise. 9. ? Function: Able to ambulate 10 minutes with normalized gait with minimal deviation, able to WB for up to 30-45 minutes with minimal complaints of pain, able to ascend/descend 6\" stairs with minor pain levels. 10. Independent with Home Exercise Programs               Patient goals: \"fix it\"    Pt. Education:  [x] Yes  [] No  [] Reviewed Prior HEP/Ed  Method of Education: [x] Verbal  [] Demo  [] Written  Comprehension of Education:  [x] Verbalizes understanding. [] Demonstrates understanding. [] Needs review. [] Demonstrates/verbalizes HEP/Ed previously given. Plan: [x] Continue per plan of care.    [] Other:      Treatment Charges: Mins Units   []  Modalities     [x]  Ther Exercise 15 1   [x]  Manual Therapy 25 2   []  Ther Activities     []  Aquatics     []  Neuromuscular     [] Vasocompression     [] Gait Training     [] Dry needling        [] 1 or 2 muscles        [] 3 or more muscles     []  Other     Total Treatment time 40

## 2020-08-06 ENCOUNTER — HOSPITAL ENCOUNTER (OUTPATIENT)
Dept: PHYSICAL THERAPY | Age: 50
Setting detail: THERAPIES SERIES
Discharge: HOME | End: 2020-08-06
Payer: COMMERCIAL

## 2020-08-06 PROCEDURE — 97110 THERAPEUTIC EXERCISES: CPT

## 2020-08-06 PROCEDURE — 97140 MANUAL THERAPY 1/> REGIONS: CPT

## 2020-08-06 NOTE — PROGRESS NOTES
[address] [facility]   [address]   Phone: [phone]   Fax: [phone]    Physical Therapy Daily Treatment Note    Date:  2020  Patient Name:  Ana Ballesteros    :  1970  MRN: [id number]  Physician: Tavia Montes                       Insurance: [facility]  Medical Diagnosis: ankle pain M25.572                      Rehab Codes: ankle pain, difficulty walking  Onset date: 20                      Next Dr's appt.: PRN  Visit# / total visits: 3  Cancels/No Shows: 0/3    Subjective:  Patient reports any type of squatting, lifting or long static position causes cramping pain. Patient reports since incident he has not been able to workout or do his normal duties at work due to cramping pain. Pain:  [x] Yes  [] No Location: (B) ankle, (B) ant tib/fib, (B) calves  Pain Rating: (0-10 scale) 5-6/10 when sitting 8/10 with WB  Pain altered Tx:  [] No  [] Yes  Action:  Comments:    Objective:  Modalities:   Precautions:  Exercises:  Exercise Reps/ Time Weight/ Level Comments   Ankle AROM DF/PF/INV/EV 15x   HEP   Reviewed swelling mitigation/elevation/toe curls for HEP     HEP   Supine calf stretch  5 15\" HEP   Cross over inversion, DF stretch 5 15\" HEP         BAPS DF/PF/INV/EV 1' ea Level 2    Heel Raises  10x2      Ankle ABC 1x      Mini squat at table  10x      Standing calf stretch  30\"x2     Stair calf stretch  15\"x2     Heel raises  15x  2 up, 1 down                 Manual   25'   PROM to (B) ankles, STM with rockblade to (B) calves    Other:      Specific Instructions for next treatment:      Assessment: Patient reported pain with both mini squats and standing calf stretch, but reported \"tightness\" with all other exercises this date. Patient had cramping pain in mid session due to exercises causing patient to immediately sit down for long rest break.   Patient requested long amount of time spent on manual this date in order to assist with (B) calf, shin and ankle tightness, no reports of relief at end of session. [] Progressing toward goals. [] No change. [] Other:    [] Patient would continue to benefit from skilled physical therapy services in order to:     STG: (to be met in 6 treatments)  1. ? Pain: Patient with improved pain levels to 2-3/10 with activity/exercises in clinic. 2. ? ROM: Improved ankle AROM to neutral AROM DF, 55 degrees PF, inversion 45 degrees, eversion 15 degrees  3. ? Strength: Able to better tolerate strength testing grossly; improved strength testing to 4 to 4+/5 with seated testing. 4. ? Function: Able to ambulate 10 minutes with normalized gait with minimal deviation, able to WB for up to 30-45 minutes with minimal complaints of pain, able to ascend/descend 6\" stairs with minor pain levels. 5. Independent with Home Exercise Programs     LTG: (to be met in 12 treatments)  6. ? Pain: Patient with improved pain levels to 0-1/10 with activity/exercises in clinic.  7. ? ROM: Improved ankle AROM to 5 degrees AROM DF, 65 degrees PF, inversion 55 degrees, eversion 20 degrees  8. ? Strength: Able to better tolerate strength testing grossly; improved strength testing to 4+/5 with seated testing. Able to do (B) heel raise. Able to do SLS heel raise. 9. ? Function: Able to ambulate 10 minutes with normalized gait with minimal deviation, able to WB for up to 30-45 minutes with minimal complaints of pain, able to ascend/descend 6\" stairs with minor pain levels. 10. Independent with Home Exercise Programs               Patient goals: \"fix it\"    Pt. Education:  [x] Yes  [] No  [] Reviewed Prior HEP/Ed  Method of Education: [x] Verbal  [] Demo  [] Written  Comprehension of Education:  [x] Verbalizes understanding. [] Demonstrates understanding. [] Needs review. [] Demonstrates/verbalizes HEP/Ed previously given. Plan: [x] Continue per plan of care.    [] Other:      Treatment Charges: Mins Units   []  Modalities     [x]  Ther Exercise 15 1   [x]  Manual Therapy 25 2   []  Ther Activities     []  Aquatics     []  Neuromuscular     [] Vasocompression     [] Gait Training     [] Dry needling        [] 1 or 2 muscles        [] 3 or more muscles     []  Other     Total Treatment time 40 3     Time In:500pm          Time Out: 540pm    Electronically signed by:  Varun Arnett PTA

## 2020-08-07 ENCOUNTER — APPOINTMENT (OUTPATIENT)
Dept: PHYSICAL THERAPY | Age: 50
End: 2020-08-07
Payer: COMMERCIAL

## 2020-08-10 ENCOUNTER — HOSPITAL ENCOUNTER (OUTPATIENT)
Dept: PHYSICAL THERAPY | Age: 50
Setting detail: THERAPIES SERIES
Discharge: HOME | End: 2020-08-10
Payer: COMMERCIAL

## 2020-08-10 PROCEDURE — 97110 THERAPEUTIC EXERCISES: CPT

## 2020-08-10 NOTE — PROGRESS NOTES
[address] [facility]   [address] [facility] [address]   Phone: [phone]   Fax: [phone]    Physical Therapy Daily Treatment Note    Date:  8/10/2020  Patient Name:  Marleen Metzger    :  1970  MRN: [id number]  Physician: Link Carpenter                       Insurance: [facility]  Medical Diagnosis: ankle pain M25.572                      Rehab Codes: ankle pain, difficulty walking  Onset date: 20                      Next Dr's appt.: PRN  Visit# / total visits: 4  Cancels/No Shows: 0/3    Subjective:  Patient reports no change in symptoms this date, states he is eager to have an MRI because he wants to know if \"tissue\" is injured from accident. States he was sore from manual therapy last session and Left heel has lingering soreness from last Friday.     Pain:  [x] Yes  [] No Location: (B) ankle, (B) ant tib/fib, (B) calves  Pain Rating: (0-10 scale) 5-6/10 when sitting 8/10 with WB  Pain altered Tx:  [] No  [] Yes  Action:  Comments:    Objective:  Modalities:   Precautions:  Exercises:  Exercise Reps/ Time Weight/ Level Comments   Ankle AROM DF/PF/INV/EV 15x   HEP   Reviewed swelling mitigation/elevation/toe curls for HEP     HEP   Supine calf stretch  5 15\" HEP   Cross over inversion, DF stretch 5 15\" HEP         BAPS DF/PF/INV/EV 1' ea Level 2    Heel Raises  10x2      Ankle ABC 1x      Mini squat at table  10x      Standing calf stretch  30\"x2  Prior to exercise and after MHP    Stair calf stretch  15\"x2  Prior to exercise and after MHP    Standing HS stretch  30\"x2  6' step  Prior to exercise and after MHP    Heel raises  15x  2 up, 1 down    SLS  30\"x2             Manual   25'   PROM to (B) ankles, STM with rockblade to (B) calves - held 8/10/20   Other:      Specific Instructions for next treatment:      Assessment:  Patient reported pain with most exercise this date along with reports of tenderness (B) heels, elected to withhold manual this date and end with MHP to (B) calves with stretches completed in attempt to alleviate the \"tightness\" that the patient continuously reports. [] Progressing toward goals. [] No change. [] Other:    [] Patient would continue to benefit from skilled physical therapy services in order to:     STG: (to be met in 6 treatments)  1. ? Pain: Patient with improved pain levels to 2-3/10 with activity/exercises in clinic. 2. ? ROM: Improved ankle AROM to neutral AROM DF, 55 degrees PF, inversion 45 degrees, eversion 15 degrees  3. ? Strength: Able to better tolerate strength testing grossly; improved strength testing to 4 to 4+/5 with seated testing. 4. ? Function: Able to ambulate 10 minutes with normalized gait with minimal deviation, able to WB for up to 30-45 minutes with minimal complaints of pain, able to ascend/descend 6\" stairs with minor pain levels. 5. Independent with Home Exercise Programs     LTG: (to be met in 12 treatments)  6. ? Pain: Patient with improved pain levels to 0-1/10 with activity/exercises in clinic.  7. ? ROM: Improved ankle AROM to 5 degrees AROM DF, 65 degrees PF, inversion 55 degrees, eversion 20 degrees  8. ? Strength: Able to better tolerate strength testing grossly; improved strength testing to 4+/5 with seated testing. Able to do (B) heel raise. Able to do SLS heel raise. 9. ? Function: Able to ambulate 10 minutes with normalized gait with minimal deviation, able to WB for up to 30-45 minutes with minimal complaints of pain, able to ascend/descend 6\" stairs with minor pain levels. 10. Independent with Home Exercise Programs               Patient goals: \"fix it\"    Pt. Education:  [x] Yes  [] No  [] Reviewed Prior HEP/Ed  Method of Education: [x] Verbal  [] Demo  [] Written  Comprehension of Education:  [x] Verbalizes understanding. [] Demonstrates understanding. [] Needs review. [] Demonstrates/verbalizes HEP/Ed previously given. Plan: [x] Continue per plan of care.    [] Other:      Treatment Charges: Mins Units   []  Modalities     [x]  Ther Exercise 24 2   []  Manual Therapy     []  Ther Activities     []  Aquatics     []  Neuromuscular     [] Vasocompression     [] Gait Training     [] Dry needling        [] 1 or 2 muscles        [] 3 or more muscles     [x]  Other-MHP 10 0   Total Treatment time 34 2     Time In:1000am          Time Out: [id number]    Electronically signed by:  Nick Bueno PTA

## 2020-08-17 ENCOUNTER — HOSPITAL ENCOUNTER (OUTPATIENT)
Dept: PHYSICAL THERAPY | Age: 50
Setting detail: THERAPIES SERIES
Discharge: HOME | End: 2020-08-17
Payer: COMMERCIAL

## 2020-08-17 PROCEDURE — 97110 THERAPEUTIC EXERCISES: CPT

## 2020-08-17 NOTE — PROGRESS NOTES
pain symptoms reported this date, objective measurements take with patient lacking 5 degrees in DF and EV for L ankle, R ankle improvement with being in neutral this date. [] Progressing toward goals. [] No change. [] Other:    [] Patient would continue to benefit from skilled physical therapy services in order to:     STG: (to be met in 6 treatments)  1. ? Pain: Patient with improved pain levels to 2-3/10 with activity/exercises in clinic. 2. ? ROM: Improved ankle AROM to neutral AROM DF, 55 degrees PF, inversion 45 degrees, eversion 15 degrees  3. ? Strength: Able to better tolerate strength testing grossly; improved strength testing to 4 to 4+/5 with seated testing. 4. ? Function: Able to ambulate 10 minutes with normalized gait with minimal deviation, able to WB for up to 30-45 minutes with minimal complaints of pain, able to ascend/descend 6\" stairs with minor pain levels. 5. Independent with Home Exercise Programs     LTG: (to be met in 12 treatments)  6. ? Pain: Patient with improved pain levels to 0-1/10 with activity/exercises in clinic.  7. ? ROM: Improved ankle AROM to 5 degrees AROM DF, 65 degrees PF, inversion 55 degrees, eversion 20 degrees  8. ? Strength: Able to better tolerate strength testing grossly; improved strength testing to 4+/5 with seated testing. Able to do (B) heel raise. Able to do SLS heel raise. 9. ? Function: Able to ambulate 10 minutes with normalized gait with minimal deviation, able to WB for up to 30-45 minutes with minimal complaints of pain, able to ascend/descend 6\" stairs with minor pain levels. 10. Independent with Home Exercise Programs               Patient goals: \"fix it\"    Pt. Education:  [x] Yes  [] No  [] Reviewed Prior HEP/Ed  Method of Education: [x] Verbal  [] Demo  [] Written  Comprehension of Education:  [x] Verbalizes understanding. [] Demonstrates understanding. [] Needs review.   [] Demonstrates/verbalizes HEP/Ed previously given.     Plan: [x] Continue per plan of care.    [] Other:      Treatment Charges: Mins Units   []  Modalities     [x]  Ther Exercise 30 2   []  Manual Therapy     []  Ther Activities     []  Aquatics     []  Neuromuscular     [] Vasocompression     [] Gait Training     [] Dry needling        [] 1 or 2 muscles        [] 3 or more muscles     []  Other-MHP     Total Treatment time 30 2     Time In:1000am          Time Out: 3426OR    Electronically signed by:  Gris Dick PTA

## 2020-08-18 NOTE — PROGRESS NOTES
degrees Lacks 5 degrees AROM, PROM 5 degrees       PF 45 45       INV 35 35 pain in lateral compartment       EVER 10 10 pain in lateral compartment                   UNABLE TO TEST MINIMAL resisted tested/strength testing from testing over edge of bed.     Girth testing:  FROM INITIAL EVAL- not testing this treatment 8/17/20- JR  Bimalleolar girth (R) 22.8cm; (L) 22.2  Figure 8 girth (R) 54cm, (L) 53cm  Tibial girth 25 cm proximal from lateral malleolus (R) 38th, (L) 37cm  MILD SWELLING in (R) versus (L) LE. Assessment:  Minimal change in objective progress at this time with continued pain and limitation in the (L) calcaneus with walking more than ~100', with squatting, with attempting heel raise. Patient with minor improvement with ROM testing but still significantly limited/guarded with AROM at this time. Unable to tolerate full strengthening due to pain. Returning to physician to evaluate continued symptoms and patient may have MRI in future per the patient. STG: (to be met in 6 treatments)  1. ? Pain: Patient with improved pain levels to 2-3/10 with activity/exercises in clinic. NOT MET  2. ? ROM: Improved ankle AROM to neutral AROM DF, 55 degrees PF, inversion 45 degrees, eversion 15 degrees MINIMAL IMPROVEMENT  3. ? Strength: Able to better tolerate strength testing grossly; improved strength testing to 4 to 4+/5 with seated testing. NOT MET  4. ? Function: Able to ambulate 10 minutes with normalized gait with minimal deviation NOT MET, able to WB for up to 30-45 minutes with minimal complaints of pain NOT MET, able to ascend/descend 6\" stairs with minor pain levels NOT MET. 5. Independent with Home Exercise Programs MET     LTG: (to be met in 12 treatments)  6. ? Pain: Patient with improved pain levels to 0-1/10 with activity/exercises in clinic NOT MET.   7. ? ROM: Improved ankle AROM to 5 degrees AROM DF, 65 degrees PF, inversion 55 degrees, eversion 20 degrees PROGRESSING MINIMALLY  8. ? Strength: Able to better tolerate strength testing grossly; improved strength testing to 4+/5 with seated testing. Able to do (B) heel raise. Able to do SLS heel raise. NOT MET    9. ? Function: Able to ambulate 10 minutes with normalized gait with minimal deviation NOT MET, able to WB for up to 30-45 minutes with minimal complaints of pain NOT MET, able to ascend/descend 8\" stairs with minor pain levels NOT MET. 10. Independent with Home Exercise Programs               Patient goals: \"fix it\"  Treatment to Date:  [x] Therapeutic Exercise   74852  [] Iontophoresis: 4 mg/mL Dexamethasone Sodium Phosphate  mAmin  08972   [] Therapeutic Activity  49826 [x] Vasopneumatic cold with compression  93193    [] Gait Training   86642 [] Ultrasound   20886   [] Neuromuscular Re-education  32842 [] Electrical Stimulation Unattended  70917   [x] Manual Therapy  55047 [] Electrical Stimulation Attended  35928   [x] Instruction in HEP  [] Lumbar/Cervical Traction  51174   [] Aquatic Therapy   88751 [] Cold/hotpack    [] Massage   63272      [] Dry Needling, 1 or 2 muscles  94522   [] Biofeedback, first 15 minutes   18089  [] Biofeedback, additional 15 minutes   79829 [] Dry Needling, 3 or more muscles  96527      Patient Status:     [] Continue per initial plan of care. [] Additional visits necessary. [x] Other: On hold- possible additional testing and MRI was ordered. Electronically signed by: Jose Alfredo Bautista PT    If you have any questions or concerns, please don't hesitate to call.   Thank you for your referral.

## 2020-08-26 ENCOUNTER — HOSPITAL ENCOUNTER (EMERGENCY)
Age: 50
Discharge: LEFT BEFORE DISCHARGE | End: 2020-08-26
Attending: STUDENT IN AN ORGANIZED HEALTH CARE EDUCATION/TRAINING PROGRAM
Payer: COMMERCIAL

## 2020-08-26 ENCOUNTER — APPOINTMENT (OUTPATIENT)
Dept: GENERAL RADIOLOGY | Age: 50
End: 2020-08-26
Payer: COMMERCIAL

## 2020-08-26 ENCOUNTER — NURSE TRIAGE (OUTPATIENT)
Dept: OTHER | Facility: CLINIC | Age: 50
End: 2020-08-26

## 2020-08-26 VITALS
HEIGHT: 72 IN | SYSTOLIC BLOOD PRESSURE: 116 MMHG | HEART RATE: 62 BPM | OXYGEN SATURATION: 99 % | WEIGHT: 170 LBS | DIASTOLIC BLOOD PRESSURE: 77 MMHG | BODY MASS INDEX: 23.03 KG/M2 | RESPIRATION RATE: 16 BRPM | TEMPERATURE: 97.1 F

## 2020-08-26 LAB
ABSOLUTE EOS #: 0.1 K/UL (ref 0–0.4)
ABSOLUTE IMMATURE GRANULOCYTE: NORMAL K/UL (ref 0–0.3)
ABSOLUTE LYMPH #: 2.5 K/UL (ref 1–4.8)
ABSOLUTE MONO #: 0.4 K/UL (ref 0.1–1.3)
ANION GAP SERPL CALCULATED.3IONS-SCNC: 12 MMOL/L (ref 9–17)
BASOPHILS # BLD: 1 % (ref 0–2)
BASOPHILS ABSOLUTE: 0 K/UL (ref 0–0.2)
BNP INTERPRETATION: NORMAL
BUN BLDV-MCNC: 20 MG/DL (ref 6–20)
BUN/CREAT BLD: NORMAL (ref 9–20)
CALCIUM SERPL-MCNC: 9.3 MG/DL (ref 8.6–10.4)
CHLORIDE BLD-SCNC: 103 MMOL/L (ref 98–107)
CO2: 23 MMOL/L (ref 20–31)
CREAT SERPL-MCNC: 0.78 MG/DL (ref 0.7–1.2)
D-DIMER QUANTITATIVE: <0.27 MG/L FEU (ref 0–0.59)
DIFFERENTIAL TYPE: NORMAL
EOSINOPHILS RELATIVE PERCENT: 1 % (ref 0–4)
GFR AFRICAN AMERICAN: >60 ML/MIN
GFR NON-AFRICAN AMERICAN: >60 ML/MIN
GFR SERPL CREATININE-BSD FRML MDRD: NORMAL ML/MIN/{1.73_M2}
GFR SERPL CREATININE-BSD FRML MDRD: NORMAL ML/MIN/{1.73_M2}
GLUCOSE BLD-MCNC: 98 MG/DL (ref 70–99)
HCT VFR BLD CALC: 45.4 % (ref 41–53)
HEMOGLOBIN: 15.3 G/DL (ref 13.5–17.5)
IMMATURE GRANULOCYTES: NORMAL %
LYMPHOCYTES # BLD: 36 % (ref 24–44)
MCH RBC QN AUTO: 30.3 PG (ref 26–34)
MCHC RBC AUTO-ENTMCNC: 33.6 G/DL (ref 31–37)
MCV RBC AUTO: 90.1 FL (ref 80–100)
MONOCYTES # BLD: 6 % (ref 1–7)
NRBC AUTOMATED: NORMAL PER 100 WBC
PDW BLD-RTO: 13.6 % (ref 11.5–14.9)
PLATELET # BLD: 245 K/UL (ref 150–450)
PLATELET ESTIMATE: NORMAL
PMV BLD AUTO: 8.8 FL (ref 6–12)
POTASSIUM SERPL-SCNC: 4.2 MMOL/L (ref 3.7–5.3)
PRO-BNP: <20 PG/ML
RBC # BLD: 5.05 M/UL (ref 4.5–5.9)
RBC # BLD: NORMAL 10*6/UL
SEG NEUTROPHILS: 56 % (ref 36–66)
SEGMENTED NEUTROPHILS ABSOLUTE COUNT: 4 K/UL (ref 1.3–9.1)
SODIUM BLD-SCNC: 138 MMOL/L (ref 135–144)
TROPONIN INTERP: NORMAL
TROPONIN T: NORMAL NG/ML
TROPONIN, HIGH SENSITIVITY: <6 NG/L (ref 0–22)
WBC # BLD: 7.1 K/UL (ref 3.5–11)
WBC # BLD: NORMAL 10*3/UL

## 2020-08-26 PROCEDURE — 80048 BASIC METABOLIC PNL TOTAL CA: CPT

## 2020-08-26 PROCEDURE — 85025 COMPLETE CBC W/AUTO DIFF WBC: CPT

## 2020-08-26 PROCEDURE — 71045 X-RAY EXAM CHEST 1 VIEW: CPT

## 2020-08-26 PROCEDURE — 99285 EMERGENCY DEPT VISIT HI MDM: CPT

## 2020-08-26 PROCEDURE — 85379 FIBRIN DEGRADATION QUANT: CPT

## 2020-08-26 PROCEDURE — 84484 ASSAY OF TROPONIN QUANT: CPT

## 2020-08-26 PROCEDURE — 83880 ASSAY OF NATRIURETIC PEPTIDE: CPT

## 2020-08-26 PROCEDURE — 36415 COLL VENOUS BLD VENIPUNCTURE: CPT

## 2020-08-26 ASSESSMENT — ENCOUNTER SYMPTOMS
RHINORRHEA: 0
NAUSEA: 0
COLOR CHANGE: 0
EYE REDNESS: 0
COUGH: 0
EYE PAIN: 0
BACK PAIN: 0
VOMITING: 0
SORE THROAT: 0
SHORTNESS OF BREATH: 0
ABDOMINAL PAIN: 0
DIARRHEA: 0
FACIAL SWELLING: 0

## 2020-08-26 ASSESSMENT — PAIN SCALES - GENERAL: PAINLEVEL_OUTOF10: 6

## 2020-08-26 NOTE — ED NOTES
Pt presents in ED c/o mid sternal chest pain; radiates into the left arm; reports that the symptoms began on 8/25 and were intermittent; pt was at rest at onset; pt stated that the pain is now more constant and severe; denies further symptoms. Shantell Rodriguez PCT to complete EKG in triage.      Clement Villafuerte RN  08/26/20 0288

## 2020-08-26 NOTE — ED PROVIDER NOTES
EMERGENCY DEPARTMENT ENCOUNTER    Pt Name: Johnie Vizcarra  MRN: [id number]  [place] 1970  Date of evaluation: 8/26/20  CHIEF COMPLAINT       Chief Complaint   Patient presents with    Chest Pain     HISTORY OF PRESENT ILLNESS   HPI  78-year-old male history of hyperlipidemia presents with chief complaint of chest pain. Patient reports lying in bed last night he developed substernal tightness. This lasted for about 30 minutes and resolve spontaneously. He did have mild symptoms earlier this morning similar to this and came into the emergency department. He has no shortness of breath or diaphoresis. No nausea or vomiting. No fever chills or cough. No history of similar symptoms in the past.  No other recent illness or hospitalization. No known cardiac history. No family history of cardiac illness. Does not smoke or drink. No history of DVT or PE      REVIEW OF SYSTEMS     Review of Systems   Constitutional: Negative for chills and fatigue. HENT: Negative for facial swelling, nosebleeds, rhinorrhea and sore throat. Eyes: Negative for pain and redness. Respiratory: Negative for cough and shortness of breath. Cardiovascular: Positive for chest pain. Negative for leg swelling. Gastrointestinal: Negative for abdominal pain, diarrhea, nausea and vomiting. Genitourinary: Negative for flank pain and hematuria. Musculoskeletal: Negative for arthralgias and back pain. Skin: Negative for color change and rash. Neurological: Negative for dizziness, tremors, facial asymmetry, speech difficulty, weakness and numbness. PASTMEDICAL HISTORY     Past Medical History:   Diagnosis Date    GERD (gastroesophageal reflux disease)     Hiatal hernia      Past Problem List  Patient Active Problem List   Diagnosis Code    Mixed hyperlipidemia E78.2    GERD (gastroesophageal reflux disease) K21.9    Epigastric pain R10.13    Hiatal hernia K44.9     SURGICAL HISTORY     History reviewed.  No pertinent with chief complaint of chest pain. He is currently chest pain-free. Will do cardiac work-up. His vital signs and EKG are unremarkable. Initial cardiac enzymes within normal limits. D-dimer undetectable. In this low risk [place] patient this makes pulmonary medicine very unlikely. Chest x-ray was performed and within normal limits. Prior to final result of chest x-ray patient became impatient and left AGAINST MEDICAL ADVICE from the emergency department. He did have decision-making capacity and was able to explain  The risks of leaving prior to repeat troponin testing. He had a family issue where he had to go  1 of his family members. He said he was going to follow-up with his primary care in the morning. AMA paperwork signed. CRITICAL CARE:       PROCEDURES:    Procedures    DIAGNOSTIC RESULTS   EKG:All EKG's are interpreted by the Emergency Department Physician who either signs or Co-signs this chart in the absence of a cardiologist.    NSR 61bpm    RADIOLOGY:All plain film, CT, MRI, and formal ultrasound images (except ED bedside ultrasound) are read by the radiologist, see reports below, unless otherwisenoted in MDM or here. XR CHEST PORTABLE   Final Result   Negative portable chest.           LABS: All lab results were reviewed by myself, and all abnormals are listed below. Labs Reviewed   CBC WITH AUTO DIFFERENTIAL   BASIC METABOLIC PANEL W/ REFLEX TO MG FOR LOW K   TROPONIN   BRAIN NATRIURETIC PEPTIDE   D-DIMER, QUANTITATIVE   TROPONIN       EMERGENCY DEPARTMENTCOURSE:         Vitals:    Vitals:    08/26/20 1404 08/26/20 1408 08/26/20 1524   BP:  116/77    Pulse: 60  62   Resp: 16     Temp: 97.1 °F (36.2 °C)     SpO2: 99%     Weight: 170 lb (77.1 kg)     Height: 6' (1.829 m)         The patient was given the following medications while in the emergency department:  No orders of the defined types were placed in this encounter. CONSULTS:  None    FINAL IMPRESSION      1.  Chest

## 2020-08-26 NOTE — PROGRESS NOTES
Medication History completed:    Medications confirmed with patient. No changes were made to his list.     Thank you,  Krista Harris   PharmD.  Candidate 2021

## 2020-08-26 NOTE — TELEPHONE ENCOUNTER
Reason for Disposition   Intermittent chest pains persist > 3 days    Answer Assessment - Initial Assessment Questions  1. LOCATION: \"Where does it hurt? \"          Center of chest area     2. RADIATION: \"Does the pain go anywhere else? \" (e.g., into neck, jaw, arms, back)         Denies    3. ONSET: \"When did the chest pain begin? \" (Minutes, hours or days)        A few days ago     4. PATTERN \"Does the pain come and go, or has it been constant since it started? \"  \"Does it get worse with exertion? \"          Intermittent     5. DURATION: \"How long does it last\" (e.g., seconds, minutes, hours)          5-10 min each     6. SEVERITY: \"How bad is the pain? \"  (e.g., Scale 1-10; mild, moderate, or severe)     - MILD (1-3): doesn't interfere with normal activities      - MODERATE (4-7): interferes with normal activities or awakens from sleep     - SEVERE (8-10): excruciating pain, unable to do any normal activities                 6/10     7. CARDIAC RISK FACTORS: \"Do you have any history of heart problems or risk factors for heart disease? \" (e.g., prior heart attack, angina; high blood pressure, diabetes, being overweight, high cholesterol, smoking, or strong family history of heart disease)          Denies    8. PULMONARY RISK FACTORS: \"Do you have any history of lung disease? \"  (e.g., blood clots in lung, asthma, emphysema, birth control pills)           Denies    9. CAUSE: \"What do you think is causing the chest pain? \"        10. OTHER SYMPTOMS: \"Do you have any other symptoms? \" (e.g., dizziness, nausea, vomiting, sweating, fever, difficulty breathing, cough)         Tingling in L arm, SOB     11. PREGNANCY: \"Is there any chance you are pregnant? \" \"When was your last menstrual period? \"           NA    Protocols used: CHEST PAIN-ADULT-[place]    Patient called [facility]) to schedule appointment, with red flag complaint, transferred to RN access for triage.      Caller reports symptoms as documented above. Caller informed of disposition. Soft transfer to [facility] in  pre-service center to schedule appointment as recommended. Care advice as documented. Pt verbalized understanding and is agreeable to plan. Please do not respond to the triage nurse through this encounter. Any subsequent communication should be directly with the patient.

## 2020-08-26 NOTE — ED NOTES
Pt requesting to leave. Pt visibly agitated and speaking aggressively to staff members in room. Pt refusing vital signs or to talk about anything that may have upset him. Pt given education on follow-up and discharge. Pt is A&O x4, PWD, eupneic, and no distress noted. Dr Eid Homes at bedside and pt educated on risks of leaving AMA. IV removed.       Pat Brannon RN  08/26/20 8453

## 2020-08-26 NOTE — ED NOTES
Mode of arrival (squad #, walk in, police, etc) :walk in         Chief complaint(s): chest pain         Arrival Note (brief scenario, treatment PTA, etc). : pt reports chest pain that started yesterday. Pt reports pain radiating down his left arm.        C= \"Have you ever felt that you should Cut down on your drinking? \"  no  A= \"Have people Annoyed you by criticizing your drinking? \"  no  G= \"Have you ever felt bad or Guilty about your drinking? \"  No  E= \"Have you ever had a drink as an Eye-opener first thing in the morning to steady your nerves or to help a hangover? \"  No      Deferred []      Reason for deferring: N/A    *If yes to two or more: probable alcohol abuse. Juanito Jorge RN  08/26/20 5808

## 2020-08-28 ENCOUNTER — HOSPITAL ENCOUNTER (OUTPATIENT)
Age: 50
Setting detail: SPECIMEN
Discharge: HOME | End: 2020-08-28
Payer: COMMERCIAL

## 2020-08-28 LAB
ABSOLUTE EOS #: 0.1 K/UL (ref 0–0.44)
ABSOLUTE IMMATURE GRANULOCYTE: <0.03 K/UL (ref 0–0.3)
ABSOLUTE LYMPH #: 2.55 K/UL (ref 1.1–3.7)
ABSOLUTE MONO #: 0.62 K/UL (ref 0.1–1.2)
ALBUMIN SERPL-MCNC: 4.3 G/DL (ref 3.5–5.2)
ALBUMIN/GLOBULIN RATIO: 1.4 (ref 1–2.5)
ALP BLD-CCNC: 79 U/L (ref 40–129)
ALT SERPL-CCNC: 72 U/L (ref 5–41)
ANION GAP SERPL CALCULATED.3IONS-SCNC: 10 MMOL/L (ref 9–17)
AST SERPL-CCNC: 44 U/L
BASOPHILS # BLD: 0 % (ref 0–2)
BASOPHILS ABSOLUTE: <0.03 K/UL (ref 0–0.2)
BILIRUB SERPL-MCNC: 0.62 MG/DL (ref 0.3–1.2)
BUN BLDV-MCNC: 18 MG/DL (ref 6–20)
BUN/CREAT BLD: ABNORMAL (ref 9–20)
CALCIUM SERPL-MCNC: 8.7 MG/DL (ref 8.6–10.4)
CHLORIDE BLD-SCNC: 103 MMOL/L (ref 98–107)
CHOLESTEROL, FASTING: 213 MG/DL
CHOLESTEROL/HDL RATIO: 4.4
CO2: 25 MMOL/L (ref 20–31)
CREAT SERPL-MCNC: 0.84 MG/DL (ref 0.7–1.2)
DIFFERENTIAL TYPE: NORMAL
EOSINOPHILS RELATIVE PERCENT: 2 % (ref 1–4)
GFR AFRICAN AMERICAN: >60 ML/MIN
GFR NON-AFRICAN AMERICAN: >60 ML/MIN
GFR SERPL CREATININE-BSD FRML MDRD: ABNORMAL ML/MIN/{1.73_M2}
GFR SERPL CREATININE-BSD FRML MDRD: ABNORMAL ML/MIN/{1.73_M2}
GLUCOSE BLD-MCNC: 103 MG/DL (ref 70–99)
HCT VFR BLD CALC: 46.1 % (ref 40.7–50.3)
HDLC SERPL-MCNC: 48 MG/DL
HEMOGLOBIN: 15.1 G/DL (ref 13–17)
IMMATURE GRANULOCYTES: 0 %
LDL CHOLESTEROL: 139 MG/DL (ref 0–130)
LYMPHOCYTES # BLD: 39 % (ref 24–43)
MCH RBC QN AUTO: 30 PG (ref 25.2–33.5)
MCHC RBC AUTO-ENTMCNC: 32.8 G/DL (ref 28.4–34.8)
MCV RBC AUTO: 91.5 FL (ref 82.6–102.9)
MONOCYTES # BLD: 10 % (ref 3–12)
NRBC AUTOMATED: 0 PER 100 WBC
PDW BLD-RTO: 12.9 % (ref 11.8–14.4)
PLATELET # BLD: 235 K/UL (ref 138–453)
PLATELET ESTIMATE: NORMAL
PMV BLD AUTO: 11 FL (ref 8.1–13.5)
POTASSIUM SERPL-SCNC: 4.5 MMOL/L (ref 3.7–5.3)
RBC # BLD: 5.04 M/UL (ref 4.21–5.77)
RBC # BLD: NORMAL 10*6/UL
SEG NEUTROPHILS: 49 % (ref 36–65)
SEGMENTED NEUTROPHILS ABSOLUTE COUNT: 3.23 K/UL (ref 1.5–8.1)
SODIUM BLD-SCNC: 138 MMOL/L (ref 135–144)
TOTAL PROTEIN: 7.4 G/DL (ref 6.4–8.3)
TRIGLYCERIDE, FASTING: 129 MG/DL
VLDLC SERPL CALC-MCNC: ABNORMAL MG/DL (ref 1–30)
WBC # BLD: 6.5 K/UL (ref 3.5–11.3)
WBC # BLD: NORMAL 10*3/UL

## 2020-08-31 ENCOUNTER — OFFICE VISIT (OUTPATIENT)
Dept: ORTHOPEDIC SURGERY | Age: 50
End: 2020-08-31
Payer: COMMERCIAL

## 2020-08-31 VITALS
SYSTOLIC BLOOD PRESSURE: 112 MMHG | BODY MASS INDEX: 25.06 KG/M2 | WEIGHT: 185 LBS | DIASTOLIC BLOOD PRESSURE: 81 MMHG | HEIGHT: 72 IN | HEART RATE: 58 BPM

## 2020-08-31 PROCEDURE — 99203 OFFICE O/P NEW LOW 30 MIN: CPT | Performed by: ORTHOPAEDIC SURGERY

## 2020-08-31 NOTE — LETTER
his next visit, we will review his MRI, and possibly consider an injection plus a cam boot, versus surgery as above. I look forward to serving you and your patients again in the future. Please don't hesitate to contact me at my mobile number .         Gina Mccarthy MD  Orthopedic Surgery, Foot and Ankle

## 2020-08-31 NOTE — PROGRESS NOTES
[address] [place] [address] ORTHOPEDICS AND SPORTS MEDICINE  [address] [facility] [address]  Dept: [phone]    Ambulatory Orthopedic Consult      CHIEF COMPLAINT:    Chief Complaint   Patient presents with    Ankle Pain     Left Ankle       HISTORY OF PRESENT ILLNESS:      The patient is a 44 y.o. male who is being seen for consultation and evaluation of pain at the deep posterior aspect of the left ankle, which began on 6/21/2020 due to an injury while shopping at [facility] he reports (reports sustaining a collision with a forklift, during which his ankle sustained a dorsiflexion mechanism). The pain is described mainly with mechanical terms (dull/sharp/throbbing). The pain is worse with activity and better with rest. The patient reports a static course. The patient has tried:      [x]  rest/activity modification          []  NSAIDs      []  opiates      []  orthotics        []  change in shoes   [x]  home exercises  [x]  physical therapy      []  CAM boot     []  brace:    []  injection:       []  surgery:      He is referred here today by Kathya Wooten. He also reports a history of pain of the right lower extremity, which has gotten better. REVIEW OF SYSTEMS:  Constitutional: Negative for fever. HENT: Negative for tinnitus. Eyes: Negative for pain. Respiratory: Negative for shortness of breath. Cardiovascular: Negative for chest pain. Gastrointestinal: Negative for abdominal pain. Genitourinary: Negative for dysuria. Skin: Negative for rash. Neurological: Negative for headaches. Hematological: Does not bruise/bleed easily. Musculoskeletal: See HPI for pertinent positives     Past Medical History:    He  has a past medical history of GERD (gastroesophageal reflux disease) and Hiatal hernia. Past Surgical History:    He  has no past surgical history on file.      Current Medications:     Current Outpatient Medications:     atorvastatin (LIPITOR) 40 MG tablet, Take 1 tablet by mouth daily, Disp: 30 tablet, Rfl: 3    meloxicam (MOBIC) 15 MG tablet, Take 1 tablet by mouth daily, Disp: 30 tablet, Rfl: 3     Allergies:    Patient has no known allergies. Family History:  family history includes No Known Problems in his brother, brother, father, mother, sister, and sister. 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 No significant swelling of foot. Skin: Intact without rash/lesions/AV malformations. Strength: Able to fire/perform the following with appropriate strength:    [x]  Tib Ant:     [x]  Gastroc-Soleus:         [x]  Inversion:    [x]  Eversion:         [x]  FHL:     [x]  EHL:      Motion:  Normal for the following joints:    [x]  Ankle:      [x]  Subtalar:        [x]  1st MTP:      []  1st TMT:            Tenderness to Palpation:    Tenderness to palpation: None      LLE:  Alignment:  Heel neutral   Vascular: Toes warm and well perfused, compartments soft/compressible. No significant swelling of foot. Skin: Intact without rash/lesions/AV malformations.   Strength: Able to fire/perform the following with appropriate strength:    [x]  Tib Ant:     [x] Gastroc-Soleus:         [x]  Inversion:    [x]  Eversion:         [x]  FHL:     [x]  EHL:      Motion:  Normal for the following joints:    [x]  Ankle:      [x]  Subtalar:        [x]  1st MTP:      []  1st TMT:            Tenderness to Palpation:    Tenderness to palpation: Anterior to the Achilles tendon in the posterior ankle    -Some pain with plantarflexion, as well as dorsiflexion      RADIOLOGY:   8/31/2020 FINDINGS:  Three weightbearing views (AP, Mortise, and Lateral) of the bilateral ankle and three weightbearing views (AP, Oblique, Lateral) of the bilateral foot were obtained in the office today and reviewed, revealing no acute fracture, dislocation, or radioopaque foreign body/tumor. The ankle mortise is maintained with no widening of the clear spaces. Overall alignment is satisfactory. Os trigonum appears present bilaterally. IMPRESSION:  No acute fracture/dislocation. 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 Body mass index is 25.09 kg/m². He has left deep posterior ankle pain, possibly related to os trigonum syndrome versus an injury to the posterior aspect of his talus, which began on 6/21/2020. Notably, he has the past medical history as above, including but not limited to the following:  GERD. I had a long discussion today with the patient about the likely diagnosis and its natural history, physical exam and imaging findings, as well as treatment options in detail. We discussed rest/activity modification, swelling control, NSAIDs/Acetaminophen/topical anesthetics, orthotics/shoewear modification, bracing/immobilization, injections, and physical therapy. Surgically, we discussed possible left os trigonum excision, depending on the results of his imaging as below. The patient wishes to proceed with the recommendations as above. Orders/referrals were placed as below at today's visit. In order to know exactly how to proceed with treatment (surgical versus nonsurgical, as well as how), an MRI was ordered today to evaluate Possible occult fracture, the Achilles tendon, the os trigonum for evidence of instability/injury, the tibiotalar joint surface for chondral lesion/OCD and for possible loose body. This is medically necessary to evaluate the structures in this area, for both diagnosis and treatment. All questions were answered and the patient agrees with the above plan. The patient will return to clinic after the MRI has been obtained. At his next visit, we will review his MRI, and possibly consider an injection plus a cam boot, versus surgery as above. No follow-ups on file. No orders of the defined types were placed in this encounter. Orders Placed This Encounter   Procedures    MRI ANKLE LEFT WO CONTRAST     MUST BE SCHEDULED AT [facility]  MUST BE DONE ON 3T MAGNET OR GREATER  Please have read by [facility] Radiologist     Standing Status:   Future     Standing Expiration Date:   8/31/2021     Scheduling Instructions:      Please call [phone] to schedule MRI as soon as possible. When you call to schedule, you will want to make the MRI appointment approximately one week from the office visit date in order to approval from your insurance company. If your insurance company denies the MRI, you will be notified prior to scan. Once MRI is scheduled, please call our office at [phone] to make a follow up appointment. Results will NOT be given over the phone. Order Specific Question:   Reason for exam:     Answer:   eval for os trigonum         Alex Kuhn MD  Orthopedic Surgery, Foot and Ankle        Please excuse any typos/errors, as this note was created with the assistance of voice recognition software.  While intending to generate a document that actually reflects the content of the visit, the document can still have some errors including those of syntax and sound-a-like substitutions which may escape proof reading. In such instances, actual meaning can be extrapolated by context.

## 2020-09-01 LAB
EKG ATRIAL RATE: 61 BPM
EKG P AXIS: 37 DEGREES
EKG P-R INTERVAL: 158 MS
EKG Q-T INTERVAL: 402 MS
EKG QRS DURATION: 84 MS
EKG QTC CALCULATION (BAZETT): 404 MS
EKG R AXIS: -11 DEGREES
EKG T AXIS: 24 DEGREES
EKG VENTRICULAR RATE: 61 BPM

## 2020-09-14 ENCOUNTER — HOSPITAL ENCOUNTER (OUTPATIENT)
Dept: NON INVASIVE DIAGNOSTICS | Age: 50
Discharge: HOME | End: 2020-09-16
Payer: COMMERCIAL

## 2020-09-14 ENCOUNTER — HOSPITAL ENCOUNTER (OUTPATIENT)
Dept: NUCLEAR MEDICINE | Age: 50
Discharge: HOME | End: 2020-09-16
Payer: COMMERCIAL

## 2020-09-14 ENCOUNTER — HOSPITAL ENCOUNTER (OUTPATIENT)
Dept: ULTRASOUND IMAGING | Age: 50
Discharge: HOME | End: 2020-09-16
Payer: COMMERCIAL

## 2020-09-14 VITALS — HEIGHT: 71 IN | BODY MASS INDEX: 24.5 KG/M2 | WEIGHT: 175 LBS

## 2020-09-14 LAB
LV EF: 47 %
LVEF MODALITY: NORMAL

## 2020-09-14 PROCEDURE — 3430000000 HC RX DIAGNOSTIC RADIOPHARMACEUTICAL: Performed by: NURSE PRACTITIONER

## 2020-09-14 PROCEDURE — A9500 TC99M SESTAMIBI: HCPCS | Performed by: NURSE PRACTITIONER

## 2020-09-14 PROCEDURE — 6360000002 HC RX W HCPCS: Performed by: NURSE PRACTITIONER

## 2020-09-14 PROCEDURE — 2580000003 HC RX 258: Performed by: NURSE PRACTITIONER

## 2020-09-14 PROCEDURE — 78452 HT MUSCLE IMAGE SPECT MULT: CPT

## 2020-09-14 PROCEDURE — 76705 ECHO EXAM OF ABDOMEN: CPT

## 2020-09-14 PROCEDURE — 93017 CV STRESS TEST TRACING ONLY: CPT

## 2020-09-14 RX ORDER — SODIUM CHLORIDE 9 MG/ML
500 INJECTION, SOLUTION INTRAVENOUS CONTINUOUS PRN
Status: ACTIVE | OUTPATIENT
Start: 2020-09-14 | End: 2020-09-14

## 2020-09-14 RX ORDER — AMINOPHYLLINE DIHYDRATE 25 MG/ML
50 INJECTION, SOLUTION INTRAVENOUS PRN
Status: ACTIVE | OUTPATIENT
Start: 2020-09-14 | End: 2020-09-14

## 2020-09-14 RX ORDER — ATROPINE SULFATE 0.1 MG/ML
0.5 INJECTION INTRAVENOUS EVERY 5 MIN PRN
Status: ACTIVE | OUTPATIENT
Start: 2020-09-14 | End: 2020-09-14

## 2020-09-14 RX ORDER — METOPROLOL TARTRATE 5 MG/5ML
5 INJECTION INTRAVENOUS EVERY 5 MIN PRN
Status: ACTIVE | OUTPATIENT
Start: 2020-09-14 | End: 2020-09-14

## 2020-09-14 RX ORDER — SODIUM CHLORIDE 0.9 % (FLUSH) 0.9 %
10 SYRINGE (ML) INJECTION PRN
Status: ACTIVE | OUTPATIENT
Start: 2020-09-14 | End: 2020-09-14

## 2020-09-14 RX ORDER — SODIUM CHLORIDE 0.9 % (FLUSH) 0.9 %
10 SYRINGE (ML) INJECTION PRN
Status: DISCONTINUED | OUTPATIENT
Start: 2020-09-14 | End: 2020-09-17 | Stop reason: HOSPADM

## 2020-09-14 RX ORDER — NITROGLYCERIN 0.4 MG/1
0.4 TABLET SUBLINGUAL EVERY 5 MIN PRN
Status: ACTIVE | OUTPATIENT
Start: 2020-09-14 | End: 2020-09-14

## 2020-09-14 RX ADMIN — REGADENOSON 0.4 MG: 0.08 INJECTION, SOLUTION INTRAVENOUS at 09:01

## 2020-09-14 RX ADMIN — Medication 10 ML: at 08:34

## 2020-09-14 RX ADMIN — TETRAKIS(2-METHOXYISOBUTYLISOCYANIDE)COPPER(I) TETRAFLUOROBORATE 34.3 MILLICURIE: 1 INJECTION, POWDER, LYOPHILIZED, FOR SOLUTION INTRAVENOUS at 09:02

## 2020-09-14 RX ADMIN — Medication 10 ML: at 07:07

## 2020-09-14 RX ADMIN — TETRAKIS(2-METHOXYISOBUTYLISOCYANIDE)COPPER(I) TETRAFLUOROBORATE 14.26 MILLICURIE: 1 INJECTION, POWDER, LYOPHILIZED, FOR SOLUTION INTRAVENOUS at 07:07

## 2020-09-14 RX ADMIN — Medication 10 ML: at 09:01

## 2020-09-14 NOTE — PROGRESS NOTES
Patient to Stress Lab, Patient Educated on Procedure and Consent Signed: Patient  Responded to Education and Verbalize Understanding. PLACED ON EKG /MONITOR AND VITALS MACHINE.  IV Flushed with NORMAL SALINE. PATIENT denies any chest pain or discomfort; Denies dizziness at this time; Emerald MONTALVO SEEN PATIENT AT THE BEDSIDE AND REVIEWED EKG. Sinus Bradycardia  /63 , HR 41 .

## 2020-09-14 NOTE — PROGRESS NOTES
LEXISCAN STRESS TEST COMPLETED; After Injection PATIENT FELT some chest pressure   PO CAFFEINE GIVEN AFTER 2 MINUTE;  B/P 132/87     HR 52 ;  PATIENT BACK TO BASELINE;  IV Removed and Patient GOING TO WAITING ROOM FOR FINAL STRESS SCANS TO FOLLOW

## 2020-09-21 ENCOUNTER — HOSPITAL ENCOUNTER (OUTPATIENT)
Dept: MRI IMAGING | Age: 50
Discharge: HOME | End: 2020-09-23
Payer: COMMERCIAL

## 2020-09-21 PROCEDURE — 73721 MRI JNT OF LWR EXTRE W/O DYE: CPT

## 2020-09-28 ENCOUNTER — TELEPHONE (OUTPATIENT)
Dept: ORTHOPEDIC SURGERY | Age: 50
End: 2020-09-28

## 2020-09-28 NOTE — TELEPHONE ENCOUNTER
Results are not given over the phone.   Please schedule an appointment for the patient to go over scan

## 2020-10-05 ENCOUNTER — OFFICE VISIT (OUTPATIENT)
Dept: ORTHOPEDIC SURGERY | Age: 50
End: 2020-10-05
Payer: COMMERCIAL

## 2020-10-05 VITALS — HEIGHT: 71 IN | BODY MASS INDEX: 24.5 KG/M2 | WEIGHT: 175 LBS | RESPIRATION RATE: 20 BRPM

## 2020-10-05 PROCEDURE — 99213 OFFICE O/P EST LOW 20 MIN: CPT | Performed by: ORTHOPAEDIC SURGERY

## 2020-10-05 RX ORDER — NICOTINE POLACRILEX 4 MG/1
20 GUM, CHEWING ORAL DAILY
Qty: 20 TABLET | Refills: 1 | Status: SHIPPED | OUTPATIENT
Start: 2020-10-05 | End: 2020-10-25

## 2020-10-05 NOTE — LETTER
[facility] Medico and Sports Medicine  [address] [place] [address]  Phone: [phone]  Fax: [phone]    Giovanny Carter MD        October 5, 2020     Patient: Marzena Breaux   YOB: 1970   Date of Visit: 10/5/2020       To Whom It May Concern: It is my medical opinion that Alix Murphy may return to work 10/06/2020. Must wear CAM Boot until revaluation in 6 weeks. If you have any questions or concerns, please don't hesitate to call.     Sincerely,       The Office of Florence Hernandez MD

## 2020-10-05 NOTE — PROGRESS NOTES
[facility] ORTHOPEDICS AND SPORTS MEDICINE  [address] [address] [facility] [address]  Dept: [phone]    Ambulatory Orthopedic Consult      CHIEF COMPLAINT:    Chief Complaint   Patient presents with    Ankle Pain     Bilateral Ankle       HISTORY OF PRESENT ILLNESS:      The patient is a 44 y.o. male who is being seen for consultation and evaluation of pain at the deep posterior aspect of the left ankle, which began on 6/21/2020 due to an injury while shopping at [facility] he reports (reports sustaining a collision with a forklift, during which his ankle sustained a dorsiflexion mechanism). The pain is described mainly with mechanical terms (dull/sharp/throbbing). The pain is worse with activity and better with rest. The patient reports a static course. The patient has tried:      [x]  rest/activity modification          []  NSAIDs      []  opiates      []  orthotics        []  change in shoes   [x]  home exercises  [x]  physical therapy      []  CAM boot     []  brace:    []  injection:       []  surgery:      He is referred here today by Hanna Herrera. He also reports a history of pain of the right lower extremity, which has gotten better. INTERVAL HISTORY 10/5/2020:  He is seen again today in the office for follow up of an MRI. Since being seen last, the patient is doing about the same overall. At today's visit, he is using no brace or assistive device. The location and quality of the pain have not significantly changed since the last visit. REVIEW OF SYSTEMS:  Constitutional: Negative for fever. HENT: Negative for tinnitus. Eyes: Negative for pain. Respiratory: Negative for shortness of breath. Cardiovascular: Negative for chest pain. Gastrointestinal: Negative for abdominal pain. Genitourinary: Negative for dysuria. Skin: Negative for rash. Neurological: Negative for headaches. Hematological: Does not bruise/bleed easily. Musculoskeletal: See HPI for pertinent positives     Past Medical History:    He  has a past medical history of GERD (gastroesophageal reflux disease) and Hiatal hernia. Past Surgical History:    He  has no past surgical history on file. Current Medications:     Current Outpatient Medications:     atorvastatin (LIPITOR) 40 MG tablet, Take 1 tablet by mouth daily, Disp: 30 tablet, Rfl: 3    meloxicam (MOBIC) 15 MG tablet, Take 1 tablet by mouth daily, Disp: 30 tablet, Rfl: 3     Allergies:    Patient has no known allergies. Family History:  family history includes No Known Problems in his brother, brother, father, mother, sister, and sister. 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 No significant swelling of foot. Skin: Intact without rash/lesions/AV malformations.   Strength: Able to fire/perform the following with appropriate strength:    [x]  Tib Ant:     [x]  Gastroc-Soleus:         [x]  Inversion:    [x]  Eversion:         [x]  FHL:     [x]  EHL:      Motion:  Normal for the following joints:    [x]  Ankle:      [x]  Subtalar:        [x]  1st MTP:      []  1st TMT:            Tenderness to Palpation:    Tenderness to palpation: None      LLE:  Alignment:  Heel neutral   Vascular: Toes warm and well perfused, compartments soft/compressible. No significant swelling of foot. Skin: Intact without rash/lesions/AV malformations. Strength: Able to fire/perform the following with appropriate strength:    [x]  Tib Ant:     [x]  Gastroc-Soleus:         [x]  Inversion:    [x]  Eversion:         [x]  FHL:     [x]  EHL:      Motion:  Normal for the following joints:    [x]  Ankle:      [x]  Subtalar:        [x]  1st MTP:      []  1st TMT:            Tenderness to Palpation:    Tenderness to palpation: Anterior to the Achilles tendon in the posterior ankle, posterior laterally along the course the peroneal tendons from the distal tip of the fibula to several centimeters proximal (no significant tenderness at the peroneal tubercle)    -Some pain with plantarflexion, as well as dorsiflexion  -Mild pain with resisted FHL, negative FHL stretch test      RADIOLOGY:   10/5/2020 Prior images reviewed for reference. MRI images and radiology report reviewed, as below:    1. Fluid distends the posterior subtalar recess which may reflect synovitis. An os trigonum is not identified.  No posterior talar bone marrow edema. 2. Mild distal tibialis posterior tendinopathy.  Evidence of prior    superomedial portion spring ligament sprain. 3. Evidence of prior high-grade ATFL and calcaneofibular ligamentous sprains. Evidence of prior low grade deep fiber deltoid ligamentous sprain.               FINDINGS:  Three weightbearing views (AP, Mortise, and Lateral) of the bilateral ankle and three weightbearing views (AP, Oblique, Lateral) of the bilateral foot were obtained in the office today and reviewed, revealing no acute fracture, dislocation, or radioopaque foreign body/tumor. The ankle mortise is maintained with no widening of the clear spaces. Overall alignment is satisfactory. Os trigonum appears present bilaterally.     IMPRESSION:  No acute fracture/dislocation. Electronically signed by Maxwell Moran MD      ASSESSMENT AND PLAN:  Tricia Lew was seen today for Ankle Pain (Bilateral Ankle)  The encounter diagnosis was Peroneal tendinitis of left lower extremity. Body mass index is 24.41 kg/m². He has left deep posterior ankle pain with an effusion, possibly coming from subtalar joint, with peroneal tendinitis and the presence of an accessory peroneal tendon. Notably, he has the past medical history as above, including but not limited to the following:  GERD. I had another discussion today with the patient about the likely diagnosis and its natural history, physical exam and imaging findings, as well as treatment options in detail. We discussed rest/activity modification, swelling control, NSAIDs/Acetaminophen/topical anesthetics, orthotics/shoewear modification, bracing/immobilization, injections, and physical therapy. Surgically, we discussed left peroneal tendon exploration with accessory tendon excision. The patient wishes to proceed with the recommendations as above. Orders/referrals were placed as below at today's visit. Patient was placed into an Ace wrap in a cam boot, and I referred him to physical therapy for my peroneal tendinopathy protocol. At today's visit, he was ordered a two week oral course of NSAIDs as below, along with GI prophylaxis, and we discussed the appropriate risks. The patient was provided teaching material on this today, and will avoid using multiple NSAIDs at the same time. All questions were answered and the patient agrees with the above plan. The patient will return to clinic okay in 8 weeks without x-rays. At his next visit, depending on how he is doing, we may consider a subtalar injection or possibly consider surgery as above. Return in about 8 weeks (around 11/30/2020). No orders of the defined types were placed in this encounter.     Orders Placed This Encounter Procedures    Ambulatory referral to Physical Therapy     Referral Priority:   Routine     Referral Type:   Eval and Treat     Referral Reason:   Specialty Services Required     Requested Specialty:   Physical Therapy     Number of Visits Requested:   1         Andrea Maxwell MD  Orthopedic Surgery, Foot and Ankle        Please excuse any typos/errors, as this note was created with the assistance of voice recognition software. While intending to generate a document that actually reflects the content of the visit, the document can still have some errors including those of syntax and sound-a-like substitutions which may escape proof reading. In such instances, actual meaning can be extrapolated by context.

## 2020-11-16 ENCOUNTER — OFFICE VISIT (OUTPATIENT)
Dept: ORTHOPEDIC SURGERY | Age: 50
End: 2020-11-16
Payer: COMMERCIAL

## 2020-11-16 VITALS — HEART RATE: 92 BPM | WEIGHT: 170 LBS | BODY MASS INDEX: 23.8 KG/M2 | TEMPERATURE: 97 F | HEIGHT: 71 IN

## 2020-11-16 PROCEDURE — 99213 OFFICE O/P EST LOW 20 MIN: CPT | Performed by: ORTHOPAEDIC SURGERY

## 2020-11-16 NOTE — LETTER
[facility] Medico and Sports Medicine  [address] [place] [id number]  Phone: [phone]  Fax: [phone]    Ute Valdovinos MD        November 16, 2020     Patient: Tobias Cho   YOB: 1970   Date of Visit: 11/16/2020       To Whom it May Concern:    Shay Gunn was seen in my clinic on 11/16/2020. He may return to work on 11/19/2020 without restrictions. If you have any questions or concerns, please don't hesitate to call.     Sincerely,     The office of Taiwo Moya MD

## 2020-11-16 NOTE — PROGRESS NOTES
[address] [place] [address] ORTHOPEDICS AND SPORTS MEDICINE  [address] [address] [facility] [address]  Dept: [phone]    Ambulatory Orthopedic Consult      CHIEF COMPLAINT:    Chief Complaint   Patient presents with    Ankle Pain     left       HISTORY OF PRESENT ILLNESS:      The patient is a 44 y.o. male who is being seen for consultation and evaluation of pain at the deep posterior aspect of the left ankle, which began on 6/21/2020 due to an injury while shopping at [employer] he reports (reports sustaining a collision with a forklift, during which his ankle sustained a dorsiflexion mechanism). The pain is described mainly with mechanical terms (dull/sharp/throbbing). The pain is worse with activity and better with rest. The patient reports a static course. The patient has tried:      [x]  rest/activity modification          []  NSAIDs      []  opiates      []  orthotics        []  change in shoes   [x]  home exercises  [x]  physical therapy      []  CAM boot     []  brace:    []  injection:       []  surgery:      He is referred here today by Holly Kim. He also reports a history of pain of the right lower extremity, which has gotten better. INTERVAL HISTORY 10/5/2020:  He is seen again today in the office for follow up of an MRI. Since being seen last, the patient is doing about the same overall. At today's visit, he is using no brace or assistive device. The location and quality of the pain have not significantly changed since the last visit. INTERVAL HISTORY 11/16/2020:  He is seen again today in the office for follow up of a previous issue (as above). Since being seen last, the patient is doing better. He is ambulating today using a CAM boot. The location and quality of the pain have not significantly changed since the last visit. REVIEW OF SYSTEMS:  Constitutional: Negative for fever. HENT: Negative for tinnitus. Eyes: Negative for pain.    Respiratory: Negative for shortness of breath. Cardiovascular: Negative for chest pain. Gastrointestinal: Negative for abdominal pain. Genitourinary: Negative for dysuria. Skin: Negative for rash. Neurological: Negative for headaches. Hematological: Does not bruise/bleed easily. Musculoskeletal: See HPI for pertinent positives     Past Medical History:    He  has a past medical history of GERD (gastroesophageal reflux disease) and Hiatal hernia. Past Surgical History:    He  has no past surgical history on file. Current Medications:     Current Outpatient Medications:     atorvastatin (LIPITOR) 40 MG tablet, Take 1 tablet by mouth daily, Disp: 30 tablet, Rfl: 3    meloxicam (MOBIC) 15 MG tablet, Take 1 tablet by mouth daily, Disp: 30 tablet, Rfl: 3    omeprazole 20 MG EC tablet, Take 1 tablet by mouth daily for 20 days Take on empty stomach 30 minutes before meals, Disp: 20 tablet, Rfl: 1    diclofenac (VOLTAREN) 50 MG EC tablet, Take 1 tablet by mouth 2 times daily for 14 days Take with food. , Disp: 30 tablet, Rfl: 1     Allergies:    Patient has no known allergies. Family History:  family history includes No Known Problems in his brother, brother, father, mother, sister, and sister.     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 No significant swelling of foot. Skin: Intact without rash/lesions/AV malformations. Strength: Able to fire/perform the following with appropriate strength:    [x]  Tib Ant:     [x]  Gastroc-Soleus:         [x]  Inversion:    [x]  Eversion:         [x]  FHL:     [x]  EHL:      Motion:  Normal for the following joints:    [x]  Ankle:      [x]  Subtalar:        [x]  1st MTP:      []  1st TMT:            Tenderness to Palpation:    Tenderness to palpation: None      LLE:  Alignment:  Heel neutral   Vascular: Toes warm and well perfused, compartments soft/compressible. No significant swelling of foot. Skin: Intact without rash/lesions/AV malformations. Strength: Able to fire/perform the following with appropriate strength:    [x]  Tib Ant:     [x]  Gastroc-Soleus:         [x]  Inversion:    [x]  Eversion:         [x]  FHL:     [x]  EHL:      Motion:  Normal for the following joints:    [x]  Ankle:      [x]  Subtalar:        [x]  1st MTP:      []  1st TMT:            Tenderness to Palpation:    Tenderness to palpation: Anterior to the Achilles tendon in the posterior ankle, posterior laterally along the course the peroneal tendons from the distal tip of the fibula to several centimeters proximal (no significant tenderness at the peroneal tubercle)    -Some pain with plantarflexion, as well as dorsiflexion  -Mild pain with resisted FHL, negative FHL stretch test      RADIOLOGY:   11/16/2020 No new radiology images today. Prior images reviewed for reference. MRI images and radiology report reviewed, as below:    1. Fluid distends the posterior subtalar recess which may reflect synovitis. An os trigonum is not identified.  No posterior talar bone marrow edema. 2. Mild distal tibialis posterior tendinopathy.  Evidence of prior    superomedial portion spring ligament sprain. 3. Evidence of prior high-grade ATFL and calcaneofibular ligamentous sprains. Evidence of prior low grade deep fiber deltoid ligamentous sprain.               FINDINGS:  Three weightbearing views (AP, Mortise, and Lateral) of the bilateral ankle and three weightbearing views (AP, Oblique, Lateral) of the bilateral foot were obtained in the office today and reviewed, revealing no acute fracture, dislocation, or radioopaque foreign body/tumor. The ankle mortise is maintained with no widening of the clear spaces. Overall alignment is satisfactory. Os trigonum appears present bilaterally. IMPRESSION:  No acute fracture/dislocation. Electronically signed by Christal An MD      ASSESSMENT AND PLAN:  Dbei Lou was seen today for Ankle Pain (left)  The primary encounter diagnosis was Peroneal tendinitis of left lower extremity. A diagnosis of Sprain of left ankle, unspecified ligament, subsequent encounter was also pertinent to this visit. Body mass index is 23.71 kg/m². He has left deep posterior ankle pain with an effusion, possibly coming from subtalar joint, with peroneal tendinitis, secondary to an injury that occurred on 6/21/2020 from a hyper dorsiflexion mechanism. Notably, he has the past medical history as above, including but not limited to the following:  GERD. I had another discussion today with the patient about the likely diagnosis and its natural history, physical exam and imaging findings, as well as treatment options in detail. Orders/referrals were placed as below at today's visit. At this point, he reports he has no pain, and has been doing very well with physical therapy, the cam boot, and a scheduled 2-week course of NSAIDs. He will begin to wean into the cam boot, and may progress his activity as tolerated. He will continue to do home exercises per physical therapy. I provided a prescription for Voltaren (4g TOPL q QID PRN pain).  I recommend this over the use of oral NSAIDs because given the patient's condition, the use of oral NSAIDs for an extended period of time will likely be necessary to help allow appropriate meaningful baseline function; and given the risk for development of side effects (GI bleeding/ulcers) with chronic use of oral anti-inflammatories, this is a much safer option. This is especially important in this situation given the patient's age as well. All questions were answered and the patient agrees with the above plan. The patient will return to clinic as needed without x-rays. At his next visit, depending on how he is doing, we may consider a subtalar and/or ankle injection. Return if symptoms worsen or fail to improve. No orders of the defined types were placed in this encounter. No orders of the defined types were placed in this encounter. Keisha Carlson MD  Orthopedic Surgery, Foot and Ankle        Please excuse any typos/errors, as this note was created with the assistance of voice recognition software. While intending to generate a document that actually reflects the content of the visit, the document can still have some errors including those of syntax and sound-a-like substitutions which may escape proof reading. In such instances, actual meaning can be extrapolated by context.

## 2020-12-28 ENCOUNTER — TELEPHONE (OUTPATIENT)
Dept: GASTROENTEROLOGY | Age: 50
End: 2020-12-28

## 2021-08-23 ENCOUNTER — APPOINTMENT (OUTPATIENT)
Dept: GENERAL RADIOLOGY | Age: 51
End: 2021-08-23
Payer: COMMERCIAL

## 2021-08-23 ENCOUNTER — HOSPITAL ENCOUNTER (EMERGENCY)
Age: 51
Discharge: HOME | End: 2021-08-23
Attending: EMERGENCY MEDICINE
Payer: COMMERCIAL

## 2021-08-23 VITALS
TEMPERATURE: 97.4 F | OXYGEN SATURATION: 97 % | WEIGHT: 170 LBS | DIASTOLIC BLOOD PRESSURE: 80 MMHG | HEIGHT: 71 IN | RESPIRATION RATE: 16 BRPM | SYSTOLIC BLOOD PRESSURE: 124 MMHG | HEART RATE: 50 BPM | BODY MASS INDEX: 23.8 KG/M2

## 2021-08-23 DIAGNOSIS — J18.9 PNEUMONITIS: Primary | ICD-10-CM

## 2021-08-23 LAB
ABSOLUTE EOS #: 0.1 K/UL (ref 0–0.4)
ABSOLUTE IMMATURE GRANULOCYTE: ABNORMAL K/UL (ref 0–0.3)
ABSOLUTE LYMPH #: 1.8 K/UL (ref 1–4.8)
ABSOLUTE MONO #: 0.7 K/UL (ref 0.1–1.3)
ANION GAP SERPL CALCULATED.3IONS-SCNC: 11 MMOL/L (ref 9–17)
BASOPHILS # BLD: 0 % (ref 0–2)
BASOPHILS ABSOLUTE: 0 K/UL (ref 0–0.2)
BNP INTERPRETATION: NORMAL
BUN BLDV-MCNC: 20 MG/DL (ref 6–20)
BUN/CREAT BLD: ABNORMAL (ref 9–20)
CALCIUM SERPL-MCNC: 9.7 MG/DL (ref 8.6–10.4)
CHLORIDE BLD-SCNC: 103 MMOL/L (ref 98–107)
CO2: 25 MMOL/L (ref 20–31)
CREAT SERPL-MCNC: 0.85 MG/DL (ref 0.7–1.2)
D-DIMER QUANTITATIVE: <0.27 MG/L FEU (ref 0–0.59)
DIFFERENTIAL TYPE: ABNORMAL
EOSINOPHILS RELATIVE PERCENT: 1 % (ref 0–4)
GFR AFRICAN AMERICAN: >60 ML/MIN
GFR NON-AFRICAN AMERICAN: >60 ML/MIN
GFR SERPL CREATININE-BSD FRML MDRD: ABNORMAL ML/MIN/{1.73_M2}
GFR SERPL CREATININE-BSD FRML MDRD: ABNORMAL ML/MIN/{1.73_M2}
GLUCOSE BLD-MCNC: 105 MG/DL (ref 70–99)
HCT VFR BLD CALC: 44.7 % (ref 41–53)
HEMOGLOBIN: 15.3 G/DL (ref 13.5–17.5)
IMMATURE GRANULOCYTES: ABNORMAL %
LYMPHOCYTES # BLD: 16 % (ref 24–44)
MCH RBC QN AUTO: 30.5 PG (ref 26–34)
MCHC RBC AUTO-ENTMCNC: 34.2 G/DL (ref 31–37)
MCV RBC AUTO: 89.2 FL (ref 80–100)
MONOCYTES # BLD: 6 % (ref 1–7)
NRBC AUTOMATED: ABNORMAL PER 100 WBC
PDW BLD-RTO: 13.5 % (ref 11.5–14.9)
PLATELET # BLD: 250 K/UL (ref 150–450)
PLATELET ESTIMATE: ABNORMAL
PMV BLD AUTO: 8.5 FL (ref 6–12)
POTASSIUM SERPL-SCNC: 4.4 MMOL/L (ref 3.7–5.3)
PRO-BNP: 20 PG/ML
RBC # BLD: 5 M/UL (ref 4.5–5.9)
RBC # BLD: ABNORMAL 10*6/UL
SEG NEUTROPHILS: 77 % (ref 36–66)
SEGMENTED NEUTROPHILS ABSOLUTE COUNT: 8.6 K/UL (ref 1.3–9.1)
SODIUM BLD-SCNC: 139 MMOL/L (ref 135–144)
TROPONIN INTERP: NORMAL
TROPONIN T: NORMAL NG/ML
TROPONIN, HIGH SENSITIVITY: 6 NG/L (ref 0–22)
WBC # BLD: 11.1 K/UL (ref 3.5–11)
WBC # BLD: ABNORMAL 10*3/UL

## 2021-08-23 PROCEDURE — 84484 ASSAY OF TROPONIN QUANT: CPT

## 2021-08-23 PROCEDURE — 6370000000 HC RX 637 (ALT 250 FOR IP): Performed by: EMERGENCY MEDICINE

## 2021-08-23 PROCEDURE — 93005 ELECTROCARDIOGRAM TRACING: CPT | Performed by: EMERGENCY MEDICINE

## 2021-08-23 PROCEDURE — 71045 X-RAY EXAM CHEST 1 VIEW: CPT

## 2021-08-23 PROCEDURE — 96374 THER/PROPH/DIAG INJ IV PUSH: CPT

## 2021-08-23 PROCEDURE — 99285 EMERGENCY DEPT VISIT HI MDM: CPT

## 2021-08-23 PROCEDURE — 83880 ASSAY OF NATRIURETIC PEPTIDE: CPT

## 2021-08-23 PROCEDURE — 6360000002 HC RX W HCPCS: Performed by: EMERGENCY MEDICINE

## 2021-08-23 PROCEDURE — 36415 COLL VENOUS BLD VENIPUNCTURE: CPT

## 2021-08-23 PROCEDURE — 85379 FIBRIN DEGRADATION QUANT: CPT

## 2021-08-23 PROCEDURE — 85025 COMPLETE CBC W/AUTO DIFF WBC: CPT

## 2021-08-23 PROCEDURE — 80048 BASIC METABOLIC PNL TOTAL CA: CPT

## 2021-08-23 RX ORDER — ALBUTEROL SULFATE 90 UG/1
2 AEROSOL, METERED RESPIRATORY (INHALATION) ONCE
Status: COMPLETED | OUTPATIENT
Start: 2021-08-23 | End: 2021-08-23

## 2021-08-23 RX ORDER — ONDANSETRON 2 MG/ML
4 INJECTION INTRAMUSCULAR; INTRAVENOUS ONCE
Status: COMPLETED | OUTPATIENT
Start: 2021-08-23 | End: 2021-08-23

## 2021-08-23 RX ORDER — PREDNISONE 10 MG/1
TABLET ORAL
Qty: 20 TABLET | Refills: 0 | Status: SHIPPED | OUTPATIENT
Start: 2021-08-23 | End: 2021-09-02

## 2021-08-23 RX ADMIN — ALBUTEROL SULFATE 2 PUFF: 90 AEROSOL, METERED RESPIRATORY (INHALATION) at 15:47

## 2021-08-23 RX ADMIN — ONDANSETRON 4 MG: 2 INJECTION INTRAMUSCULAR; INTRAVENOUS at 15:47

## 2021-08-23 ASSESSMENT — ENCOUNTER SYMPTOMS
VOMITING: 0
DIARRHEA: 0
COLOR CHANGE: 0
ABDOMINAL PAIN: 0
NAUSEA: 1
CONSTIPATION: 0
CHEST TIGHTNESS: 1
SHORTNESS OF BREATH: 1

## 2021-08-23 ASSESSMENT — PAIN DESCRIPTION - PAIN TYPE: TYPE: ACUTE PAIN

## 2021-08-23 ASSESSMENT — PAIN SCALES - GENERAL: PAINLEVEL_OUTOF10: 8

## 2021-08-23 NOTE — ED PROVIDER NOTES
[address] [facility] ED     Emergency Department     Faculty Attestation        I performed a history and physical examination of the patient and discussed management with the resident. I reviewed the residents note and agree with the documented findings and plan of care. Any areas of disagreement are noted on the chart. I was personally present for the key portions of any procedures. I have documented in the chart those procedures where I was not present during the key portions. I have reviewed the emergency nurses triage note. I agree with the chief complaint, past medical history, past surgical history, allergies, medications, social and family history as documented unless otherwise noted below. Documentation of the HPI, Physical Exam and Medical Decision Making performed by medical students or scribes is based on my personal performance of the HPI, PE and MDM. For Physician Assistant/ Nurse Practitioner cases/documentation I have have had a face to face evaluation with this patient and have completed at least one if not all key elements of the E/M (history, physical exam, and MDM). Additional findings are as noted.     Pertinent Comments     EKG Interpretation    Interpreted by me    Rhythm: normal sinus   Rate: normal  Axis: normal  Ectopy: none  Conduction: normal  ST Segments: no acute change  T Waves: Nonspecific changes  Q Waves: none    Clinical Impression: Nonspecific EKG    (Please note that portions of this note were completed with a voice recognition program.  Efforts were made to edit the dictations but occasionally words are mis-transcribed.)    Sunny Valles DO  Attending Emergency Physician         Sunny Valles DO  08/23/21 1539

## 2021-08-23 NOTE — ED TRIAGE NOTES
Mode of arrival (squad #, walk in, police, etc) : Walk in        Chief complaint(s): Dizziness, shortness of breath, weakness, nausea        Arrival Note (brief scenario, treatment PTA, etc). : Pt arrives to ED c/o dizziness. 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 Patient reports difficulty breathing, nausea and weakness. EKG complete in triage. C= \"Have you ever felt that you should Cut down on your drinking? \"  No  A= \"Have people Annoyed you by criticizing your drinking? \"  No  G= \"Have you ever felt bad or Guilty about your drinking? \"  No  E= \"Have you ever had a drink as an Eye-opener first thing in the morning to steady your nerves or to help a hangover? \"  No      Deferred []      Reason for deferring: N/A    *If yes to two or more: probable alcohol abuse. *

## 2021-08-23 NOTE — ED PROVIDER NOTES
[address]  Emergency Department Encounter  EmergencyMedicine Resident     Pt Name:Reynold Gill  MRN: [id number]  Jasongfradha 1970  Date of evaluation: 8/23/21  PCP:  MARIELLE Murillo CNP    CHIEF COMPLAINT       Chief Complaint   Patient presents with    Shortness of Breath    Dizziness    Generalized Body Aches       HISTORY OF PRESENT ILLNESS  (Location/Symptom, Timing/Onset, Context/Setting, Quality, Duration, Modifying Factors, Severity.)      Omari Guido is a 46 y.o. male who presents with acute onset of shortness of breath x1 hour. 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 Patient states that it is difficult to catch his breath, requesting oxygen though he saturating well in the room. Patient states he has no respiratory medical history, no asthma, no allergies, no COPD. States that he has some nausea, and mild diffuse abdominal pain that comes and goes, describes it is nonradiating, achy. Patient has no cardiac history in his family or himself. describes no palpitations. PAST MEDICAL / SURGICAL / SOCIAL / FAMILY HISTORY      has a past medical history of GERD (gastroesophageal reflux disease) and Hiatal hernia. No additional pertinent     has no past surgical history on file.   No additional pertinent    Social History     Socioeconomic History    Marital status:      Spouse name: Not on file    Number of children: Not on file    Years of education: Not on file    Highest education level: Not on file   Occupational History    Not on file   Tobacco Use    Smoking status: Never Smoker    Smokeless tobacco: Never Used   Vaping Use    Vaping Use: Never used   Substance and Sexual Activity    Alcohol use: No    Drug use: No    Sexual activity: Not on file   Other Topics Concern    Not on file   Social History Narrative    Not on file     Social Determinants of Health     Financial Resource Strain:     Difficulty of Paying Living Expenses:    Food Insecurity:     Worried About Running Out of Food in the Last Year:     [address] in the Last Year:    Transportation Needs:     Lack of Transportation (Medical):  Lack of Transportation (Non-Medical):    Physical Activity:     Days of Exercise per Week:     Minutes of Exercise per Session:    Stress:     Feeling of Stress :    Social Connections:     Frequency of Communication with Friends and Family:     Frequency of Social Gatherings with Friends and Family:     Attends Christianity Services:     Active Member of Clubs or Organizations:     Attends Club or Organization Meetings:     Marital Status:    Intimate Partner Violence:     Fear of Current or Ex-Partner:     Emotionally Abused:     Physically Abused:     Sexually Abused:        Family History   Problem Relation Age of Onset    No Known Problems Mother     No Known Problems Father     No Known Problems Sister     No Known Problems Brother     No Known Problems Brother     No Known Problems Sister        Allergies:  Patient has no known allergies. Home Medications:  Prior to Admission medications    Medication Sig Start Date End Date Taking? Authorizing Provider   predniSONE (DELTASONE) 10 MG tablet Take 4 tablets by mouth once daily for 5 days 8/23/21 9/2/21 Yes Anshu Greer DO   diclofenac sodium (VOLTAREN) 1 % GEL Apply topically 2 times daily 11/16/20   Emma Mccormack MD   omeprazole 20 MG EC tablet Take 1 tablet by mouth daily for 20 days Take on empty stomach 30 minutes before meals 10/5/20 10/25/20  Emma Mccormack MD   diclofenac (VOLTAREN) 50 MG EC tablet Take 1 tablet by mouth 2 times daily for 14 days Take with food.  10/5/20 10/19/20  Emma Mccormack MD   atorvastatin (LIPITOR) 40 MG tablet Take 1 tablet by mouth daily 8/28/20   MARIELLE Chappell - CNP   meloxicam (MOBIC) 15 MG tablet Take 1 tablet by mouth daily 2/21/20   Aram Vincent APRN - CNP       REVIEW OF SYSTEMS    (2-9 systems for level 4, 10 or more for level 5)      Review of Systems   Constitutional: Negative for chills and fever. HENT: Negative for congestion. Respiratory: Positive for chest tightness and shortness of breath. Cardiovascular: Negative for chest pain, palpitations and leg swelling. Gastrointestinal: Positive for nausea. Negative for abdominal pain, constipation, diarrhea and vomiting. Endocrine: Negative for polyuria. Genitourinary: Negative for difficulty urinating. Skin: Negative for color change. Neurological: Positive for light-headedness. Negative for dizziness, weakness and headaches. Psychiatric/Behavioral: Negative for confusion. PHYSICAL EXAM   (up to 7 for level 4, 8 or more for level 5)      INITIAL VITALS:   /82   Pulse 54   Temp 97.4 °F (36.3 °C) (Oral)   Resp 14   Ht 5' 11\" (1.803 m)   Wt 170 lb (77.1 kg)   SpO2 96%   BMI 23.71 kg/m²     Physical Exam  Constitutional:       Appearance: Normal appearance. HENT:      Head: Normocephalic and atraumatic. Mouth/Throat:      Mouth: Mucous membranes are moist.      Pharynx: Oropharynx is clear. Eyes:      Extraocular Movements: Extraocular movements intact. Conjunctiva/sclera: Conjunctivae normal.   Cardiovascular:      Rate and Rhythm: Normal rate and regular rhythm. Pulses: Normal pulses. Heart sounds: Normal heart sounds. No murmur heard. Pulmonary:      Effort: Pulmonary effort is normal. No accessory muscle usage or respiratory distress. Breath sounds: Normal breath sounds. No stridor. No decreased breath sounds, wheezing, rhonchi or rales. Chest:      Chest wall: No tenderness. Abdominal:      General: Bowel sounds are normal. There is no distension. Tenderness: There is no abdominal tenderness. There is no guarding.    Musculoskeletal:         General: Normal range of motion. Comments: Range of motion noted to be normal with patient's natural movements   Skin:     General: Skin is warm and dry. Findings: No rash (On exposed skin). Neurological:      General: No focal deficit present. Mental Status: He is alert and oriented to person, place, and time. Psychiatric:         Mood and Affect: Mood normal.         Behavior: Behavior normal.         DIFFERENTIAL  DIAGNOSIS     PLAN (LABS / IMAGING / EKG):  Orders Placed This Encounter   Procedures    XR CHEST PORTABLE    CBC Auto Differential    Basic Metabolic Panel w/ Reflex to MG    Troponin    Brain Natriuretic Peptide    D-Dimer, Quantitative    EKG 12 Lead    EKG 12 Lead       MEDICATIONS ORDERED:  Orders Placed This Encounter   Medications    ondansetron (ZOFRAN) injection 4 mg    albuterol sulfate  (90 Base) MCG/ACT inhaler 2 puff     Order Specific Question:   Initiate RT Bronchodilator Protocol     Answer:    Yes    predniSONE (DELTASONE) 10 MG tablet     Sig: Take 4 tablets by mouth once daily for 5 days     Dispense:  20 tablet     Refill:  0         DIAGNOSTIC RESULTS / EMERGENCY DEPARTMENT COURSE / MDM   LAB RESULTS:  Results for orders placed or performed during the hospital encounter of 08/23/21   CBC Auto Differential   Result Value Ref Range    WBC 11.1 (H) 3.5 - 11.0 k/uL    RBC 5.00 4.5 - 5.9 m/uL    Hemoglobin 15.3 13.5 - 17.5 g/dL    Hematocrit 44.7 41 - 53 %    MCV 89.2 80 - 100 fL    MCH 30.5 26 - 34 pg    MCHC 34.2 31 - 37 g/dL    RDW 13.5 11.5 - 14.9 %    Platelets 013 325 - 759 k/uL    MPV 8.5 6.0 - 12.0 fL    NRBC Automated NOT REPORTED per 100 WBC    Differential Type NOT REPORTED     Seg Neutrophils 77 (H) 36 - 66 %    Lymphocytes 16 (L) 24 - 44 %    Monocytes 6 1 - 7 %    Eosinophils % 1 0 - 4 %    Basophils 0 0 - 2 %    Immature Granulocytes NOT REPORTED 0 %    Segs Absolute 8.60 1.3 - 9.1 k/uL    Absolute Lymph # 1.80 1.0 - 4.8 k/uL    Absolute Mono # 0.70 0.1 - 1.3 k/uL    Absolute Eos # 0.10 0.0 - 0.4 k/uL    Basophils Absolute 0.00 0.0 - 0.2 k/uL    Absolute Immature Granulocyte NOT REPORTED 0.00 - 0.30 k/uL    WBC Morphology NOT REPORTED     RBC Morphology NOT REPORTED     Platelet Estimate NOT REPORTED    Basic Metabolic Panel w/ Reflex to MG   Result Value Ref Range    Glucose 105 (H) 70 - 99 mg/dL    BUN 20 6 - 20 mg/dL    CREATININE 0.85 0.70 - 1.20 mg/dL    Bun/Cre Ratio NOT REPORTED 9 - 20    Calcium 9.7 8.6 - 10.4 mg/dL    Sodium 139 135 - 144 mmol/L    Potassium 4.4 3.7 - 5.3 mmol/L    Chloride 103 98 - 107 mmol/L    CO2 25 20 - 31 mmol/L    Anion Gap 11 9 - 17 mmol/L    GFR Non-African American >60 >60 mL/min    GFR African American >60 >60 mL/min    GFR Comment          GFR Staging NOT REPORTED    Troponin   Result Value Ref Range    Troponin, High Sensitivity 6 0 - 22 ng/L    Troponin T NOT REPORTED <0.03 ng/mL    Troponin Interp NOT REPORTED    D-Dimer, Quantitative   Result Value Ref Range    D-Dimer, Quant <0.27 0.00 - 0.59 mg/L FEU       RADIOLOGY:  XR CHEST PORTABLE   Final Result   Low lung volumes. No consolidation or sizable pleural effusion. Subtle   ground-glass opacity right mid-lower lung laterally a possibility versus   superimposed soft tissue. RECOMMENDATION:   Correlate clinically; if clinical concern for developing pneumonia or   inhalation injury, consider follow-up PA and lateral views.                 EKG  EKG Interpretation    Interpreted by emergency department physician    Rhythm: normal sinus   Rate: normal  Axis: normal  Ectopy: none  Conduction: normal  ST Segments: normal  T Waves: normal  Q Waves: none    Clinical Impression: Normal sinus rhythm    Percy Perez,      All EKG's are interpreted by the Emergency Department Physician who either signs or Co-signs this chart in the absence of a cardiologist.        PROCEDURES:  None    CONSULTS:  None    MEDICAL DECISION MAKING:  Patient presenting with acute onset of shortness of breath while pressure washing a dirty basement. Concern for pneumonitis secondary to dust or mold is high on the differential, will obtain a chest x-ray to evaluate. Patient noted to be anxious in the room, expressing need for oxygen though his saturations are 97%. Will apply oxygen for patient comfort at this time in addition to providing albuterol inhaler. Provide Zofran for patient's nausea. With patient's age and acute onset of shortness of breath will evaluate cardiac causes such as ACS in addition to pulmonary embolism. EKG was obtained while patient was in the waiting room, EKG noted to be normal, no ST elevated MI, no arrhythmias, no concern for bradycardia or tachycardia induced shortness of breath. Plan to discharge patient if everything comes back normal, with instructions to follow-up with primary care doctor for concerns of acute onset pneumonitis. Patient presented with pneumonitis per x-ray, most likely secondary to ingestion of dust per mom. Will provide prednisone for 5 days. Patient feeling much better after resting oxygen. Patient discharged home in stable condition. CRITICAL CARE:  Please see attending note    FINAL IMPRESSION      1.  Pneumonitis          DISPOSITION / PLAN     DISPOSITION        PATIENT REFERRED TO:  Estuardo Walker, APRN - CNP  [address] [facility]  [address] [place] [phone]    Schedule an appointment as soon as possible for a visit         DISCHARGE MEDICATIONS:  New Prescriptions    PREDNISONE (DELTASONE) 10 MG TABLET    Take 4 tablets by mouth once daily for 5 days       Damien Gr DO  Emergency Medicine Resident    (Please note that portions of thisnote were completed with a voice recognition program.  Efforts were made to edit the dictations but occasionally words are mis-transcribed.)       Damien Gr DO  Resident  08/23/21 [id number]

## 2021-08-25 LAB
EKG ATRIAL RATE: 71 BPM
EKG P AXIS: 46 DEGREES
EKG P-R INTERVAL: 158 MS
EKG Q-T INTERVAL: 388 MS
EKG QRS DURATION: 84 MS
EKG QTC CALCULATION (BAZETT): 421 MS
EKG R AXIS: -2 DEGREES
EKG T AXIS: 25 DEGREES
EKG VENTRICULAR RATE: 71 BPM

## 2021-08-25 PROCEDURE — 93010 ELECTROCARDIOGRAM REPORT: CPT | Performed by: INTERNAL MEDICINE

## 2022-01-05 ENCOUNTER — HOSPITAL ENCOUNTER (OUTPATIENT)
Age: 52
Setting detail: SPECIMEN
Discharge: HOME | End: 2022-01-05

## 2022-01-05 DIAGNOSIS — R73.9 ELEVATED BLOOD SUGAR: ICD-10-CM

## 2022-01-05 DIAGNOSIS — Z12.5 PROSTATE CANCER SCREENING: ICD-10-CM

## 2022-01-05 DIAGNOSIS — Z11.59 ENCOUNTER FOR HEPATITIS C SCREENING TEST FOR LOW RISK PATIENT: ICD-10-CM

## 2022-01-05 DIAGNOSIS — Z11.4 ENCOUNTER FOR SCREENING FOR HIV: ICD-10-CM

## 2022-01-05 DIAGNOSIS — E78.2 MIXED HYPERLIPIDEMIA: ICD-10-CM

## 2022-01-05 LAB
HCT VFR BLD CALC: 48.5 % (ref 40.7–50.3)
HEMOGLOBIN: 15.6 G/DL (ref 13–17)
HIV AG/AB: NONREACTIVE
MCH RBC QN AUTO: 29.6 PG (ref 25.2–33.5)
MCHC RBC AUTO-ENTMCNC: 32.2 G/DL (ref 28.4–34.8)
MCV RBC AUTO: 92 FL (ref 82.6–102.9)
NRBC AUTOMATED: 0 PER 100 WBC
PDW BLD-RTO: 13 % (ref 11.8–14.4)
PLATELET # BLD: 264 K/UL (ref 138–453)
PMV BLD AUTO: 11.3 FL (ref 8.1–13.5)
RBC # BLD: 5.27 M/UL (ref 4.21–5.77)
WBC # BLD: 7.5 K/UL (ref 3.5–11.3)

## 2022-01-06 LAB
ALBUMIN SERPL-MCNC: 4.4 G/DL (ref 3.5–5.2)
ALBUMIN/GLOBULIN RATIO: 1.3 (ref 1–2.5)
ALP BLD-CCNC: 76 U/L (ref 40–129)
ALT SERPL-CCNC: 67 U/L (ref 5–41)
ANION GAP SERPL CALCULATED.3IONS-SCNC: 16 MMOL/L (ref 9–17)
AST SERPL-CCNC: 44 U/L
BILIRUB SERPL-MCNC: 0.67 MG/DL (ref 0.3–1.2)
BUN BLDV-MCNC: 19 MG/DL (ref 6–20)
BUN/CREAT BLD: ABNORMAL (ref 9–20)
CALCIUM SERPL-MCNC: 9.5 MG/DL (ref 8.6–10.4)
CHLORIDE BLD-SCNC: 105 MMOL/L (ref 98–107)
CHOLESTEROL, FASTING: 299 MG/DL
CHOLESTEROL/HDL RATIO: 6.1
CO2: 19 MMOL/L (ref 20–31)
CREAT SERPL-MCNC: 0.86 MG/DL (ref 0.7–1.2)
ESTIMATED AVERAGE GLUCOSE: 120 MG/DL
GFR AFRICAN AMERICAN: >60 ML/MIN
GFR NON-AFRICAN AMERICAN: >60 ML/MIN
GFR SERPL CREATININE-BSD FRML MDRD: ABNORMAL ML/MIN/{1.73_M2}
GFR SERPL CREATININE-BSD FRML MDRD: ABNORMAL ML/MIN/{1.73_M2}
GLUCOSE BLD-MCNC: 88 MG/DL (ref 70–99)
HBA1C MFR BLD: 5.8 % (ref 4–6)
HDLC SERPL-MCNC: 49 MG/DL
HEPATITIS C ANTIBODY: NONREACTIVE
LDL CHOLESTEROL: 217 MG/DL (ref 0–130)
POTASSIUM SERPL-SCNC: 4.8 MMOL/L (ref 3.7–5.3)
PROSTATE SPECIFIC ANTIGEN: 1.11 UG/L
SODIUM BLD-SCNC: 140 MMOL/L (ref 135–144)
TOTAL PROTEIN: 7.8 G/DL (ref 6.4–8.3)
TRIGLYCERIDE, FASTING: 163 MG/DL
TSH SERPL DL<=0.05 MIU/L-ACNC: 0.89 MIU/L (ref 0.3–5)
VLDLC SERPL CALC-MCNC: ABNORMAL MG/DL (ref 1–30)

## 2022-01-14 ENCOUNTER — TELEPHONE (OUTPATIENT)
Dept: GASTROENTEROLOGY | Age: 52
End: 2022-01-14

## 2022-01-14 ENCOUNTER — HOSPITAL ENCOUNTER (OUTPATIENT)
Dept: ULTRASOUND IMAGING | Age: 52
Discharge: HOME | End: 2022-01-16
Payer: COMMERCIAL

## 2022-01-14 DIAGNOSIS — R79.89 LFT ELEVATION: ICD-10-CM

## 2022-01-14 PROCEDURE — 76705 ECHO EXAM OF ABDOMEN: CPT

## 2022-01-14 NOTE — TELEPHONE ENCOUNTER
LVM for patient to call the office to schedule.   First attempt and mailing letter for a second attempt

## 2022-01-14 NOTE — TELEPHONE ENCOUNTER
Est patient Dr DESAI last seen 5-13-19 for gerd and new referral for  - Gastroesophageal reflux disease, unspecified whether esophagitis present - Colon cancer screening / Presley Naik

## 2022-01-17 NOTE — TELEPHONE ENCOUNTER
Pt called in to schedule appt, pt is est with Dr. Roel Munson. Offered appt on 2/10/22, pt states he will be out of town. Offered next available appt in April. Pt was hesitant to schedule do to how far out and adv he is going to contact his PCP to see about being referred elsewhere. Patient did still schedule appt and has been added to the wait list. Pt adv will call back if he schedules sooner appt with a different GI.

## 2022-04-22 ENCOUNTER — TELEPHONE (OUTPATIENT)
Dept: GASTROENTEROLOGY | Age: 52
End: 2022-04-22

## 2022-04-22 NOTE — TELEPHONE ENCOUNTER
Patient was a no show on 4/21/22. LVM to call the office if he would like to r/s. This was a one attempt from a missed appointment. Sending back to the referring provider and sending no show letter.

## 2022-07-09 ENCOUNTER — TELEPHONE ENCOUNTER (OUTPATIENT)
Dept: URBAN - METROPOLITAN AREA CLINIC 121 | Facility: CLINIC | Age: 52
End: 2022-07-09

## 2022-07-10 ENCOUNTER — TELEPHONE ENCOUNTER (OUTPATIENT)
Dept: URBAN - METROPOLITAN AREA CLINIC 121 | Facility: CLINIC | Age: 52
End: 2022-07-10

## 2022-08-30 ENCOUNTER — TELEPHONE (OUTPATIENT)
Dept: GASTROENTEROLOGY | Age: 52
End: 2022-08-30

## 2022-08-30 NOTE — TELEPHONE ENCOUNTER
Pt called and LVM to canc his appt. Called pt back, and his VM was full. I could not leave a message.

## 2022-09-01 ENCOUNTER — HOSPITAL ENCOUNTER (OUTPATIENT)
Age: 52
Discharge: HOME | End: 2022-09-01
Payer: COMMERCIAL

## 2022-09-01 DIAGNOSIS — E78.2 MIXED HYPERLIPIDEMIA: ICD-10-CM

## 2022-09-01 LAB
ALBUMIN SERPL-MCNC: 4.4 G/DL (ref 3.5–5.2)
ALP BLD-CCNC: 87 U/L (ref 40–129)
ALT SERPL-CCNC: 48 U/L (ref 5–41)
ANION GAP SERPL CALCULATED.3IONS-SCNC: 8 MMOL/L (ref 9–17)
AST SERPL-CCNC: 40 U/L
BILIRUB SERPL-MCNC: 0.68 MG/DL (ref 0.3–1.2)
BUN BLDV-MCNC: 20 MG/DL (ref 6–20)
CALCIUM SERPL-MCNC: 9.5 MG/DL (ref 8.6–10.4)
CHLORIDE BLD-SCNC: 105 MMOL/L (ref 98–107)
CHOLESTEROL, FASTING: 218 MG/DL
CHOLESTEROL/HDL RATIO: 4.6
CO2: 27 MMOL/L (ref 20–31)
CREAT SERPL-MCNC: 0.83 MG/DL (ref 0.7–1.2)
GFR AFRICAN AMERICAN: >60 ML/MIN
GFR NON-AFRICAN AMERICAN: >60 ML/MIN
GFR SERPL CREATININE-BSD FRML MDRD: ABNORMAL ML/MIN/{1.73_M2}
GLUCOSE BLD-MCNC: 124 MG/DL (ref 70–99)
HCT VFR BLD CALC: 43.5 % (ref 41–53)
HDLC SERPL-MCNC: 47 MG/DL
HEMOGLOBIN: 14.8 G/DL (ref 13.5–17.5)
LDL CHOLESTEROL: 144 MG/DL (ref 0–130)
MCH RBC QN AUTO: 30 PG (ref 26–34)
MCHC RBC AUTO-ENTMCNC: 33.9 G/DL (ref 31–37)
MCV RBC AUTO: 88.4 FL (ref 80–100)
PDW BLD-RTO: 13.4 % (ref 11.5–14.9)
PLATELET # BLD: 233 K/UL (ref 150–450)
PMV BLD AUTO: 8.2 FL (ref 6–12)
POTASSIUM SERPL-SCNC: 4.5 MMOL/L (ref 3.7–5.3)
RBC # BLD: 4.92 M/UL (ref 4.5–5.9)
SODIUM BLD-SCNC: 140 MMOL/L (ref 135–144)
TOTAL PROTEIN: 7.6 G/DL (ref 6.4–8.3)
TRIGLYCERIDE, FASTING: 135 MG/DL
WBC # BLD: 6.1 K/UL (ref 3.5–11)

## 2022-09-01 PROCEDURE — 80061 LIPID PANEL: CPT

## 2022-09-01 PROCEDURE — 85027 COMPLETE CBC AUTOMATED: CPT

## 2022-09-01 PROCEDURE — 36415 COLL VENOUS BLD VENIPUNCTURE: CPT

## 2022-09-01 PROCEDURE — 80053 COMPREHEN METABOLIC PANEL: CPT

## 2022-10-17 ENCOUNTER — HOSPITAL ENCOUNTER (EMERGENCY)
Age: 52
Discharge: HOME | End: 2022-10-18
Attending: EMERGENCY MEDICINE
Payer: COMMERCIAL

## 2022-10-17 DIAGNOSIS — R00.1 BRADYCARDIA: ICD-10-CM

## 2022-10-17 DIAGNOSIS — R10.33 PERIUMBILICAL ABDOMINAL PAIN: Primary | ICD-10-CM

## 2022-10-17 LAB
ABSOLUTE EOS #: 0.1 K/UL (ref 0–0.4)
ABSOLUTE LYMPH #: 2.4 K/UL (ref 1–4.8)
ABSOLUTE MONO #: 0.7 K/UL (ref 0.1–1.3)
ALBUMIN SERPL-MCNC: 4.3 G/DL (ref 3.5–5.2)
ALP BLD-CCNC: 98 U/L (ref 40–129)
ALT SERPL-CCNC: 59 U/L (ref 5–41)
ANION GAP SERPL CALCULATED.3IONS-SCNC: 9 MMOL/L (ref 9–17)
AST SERPL-CCNC: 41 U/L
BASOPHILS # BLD: 1 % (ref 0–2)
BASOPHILS ABSOLUTE: 0.1 K/UL (ref 0–0.2)
BILIRUB SERPL-MCNC: 0.3 MG/DL (ref 0.3–1.2)
BUN BLDV-MCNC: 17 MG/DL (ref 6–20)
CALCIUM SERPL-MCNC: 9.4 MG/DL (ref 8.6–10.4)
CHLORIDE BLD-SCNC: 101 MMOL/L (ref 98–107)
CO2: 25 MMOL/L (ref 20–31)
CREAT SERPL-MCNC: 0.82 MG/DL (ref 0.7–1.2)
EOSINOPHILS RELATIVE PERCENT: 1 % (ref 0–4)
GFR SERPL CREATININE-BSD FRML MDRD: >60 ML/MIN/1.73M2
GLUCOSE BLD-MCNC: 150 MG/DL (ref 70–99)
HCT VFR BLD CALC: 42.5 % (ref 41–53)
HEMOGLOBIN: 14.9 G/DL (ref 13.5–17.5)
LACTIC ACID: 1.5 MMOL/L (ref 0.5–2.2)
LIPASE: 19 U/L (ref 13–60)
LYMPHOCYTES # BLD: 27 % (ref 24–44)
MCH RBC QN AUTO: 30.5 PG (ref 26–34)
MCHC RBC AUTO-ENTMCNC: 35.1 G/DL (ref 31–37)
MCV RBC AUTO: 87 FL (ref 80–100)
MONOCYTES # BLD: 8 % (ref 1–7)
PDW BLD-RTO: 13.1 % (ref 11.5–14.9)
PLATELET # BLD: 234 K/UL (ref 150–450)
PMV BLD AUTO: 8.9 FL (ref 6–12)
POTASSIUM SERPL-SCNC: 3.9 MMOL/L (ref 3.7–5.3)
RBC # BLD: 4.89 M/UL (ref 4.5–5.9)
SEG NEUTROPHILS: 63 % (ref 36–66)
SEGMENTED NEUTROPHILS ABSOLUTE COUNT: 5.6 K/UL (ref 1.3–9.1)
SODIUM BLD-SCNC: 135 MMOL/L (ref 135–144)
TOTAL PROTEIN: 7.4 G/DL (ref 6.4–8.3)
WBC # BLD: 8.9 K/UL (ref 3.5–11)

## 2022-10-17 PROCEDURE — 85025 COMPLETE CBC W/AUTO DIFF WBC: CPT

## 2022-10-17 PROCEDURE — 96361 HYDRATE IV INFUSION ADD-ON: CPT

## 2022-10-17 PROCEDURE — 96374 THER/PROPH/DIAG INJ IV PUSH: CPT

## 2022-10-17 PROCEDURE — 80053 COMPREHEN METABOLIC PANEL: CPT

## 2022-10-17 PROCEDURE — 83690 ASSAY OF LIPASE: CPT

## 2022-10-17 PROCEDURE — 81003 URINALYSIS AUTO W/O SCOPE: CPT

## 2022-10-17 PROCEDURE — 36415 COLL VENOUS BLD VENIPUNCTURE: CPT

## 2022-10-17 PROCEDURE — 99285 EMERGENCY DEPT VISIT HI MDM: CPT

## 2022-10-17 PROCEDURE — 83605 ASSAY OF LACTIC ACID: CPT

## 2022-10-17 RX ORDER — 0.9 % SODIUM CHLORIDE 0.9 %
1000 INTRAVENOUS SOLUTION INTRAVENOUS ONCE
Status: COMPLETED | OUTPATIENT
Start: 2022-10-17 | End: 2022-10-18

## 2022-10-17 ASSESSMENT — PAIN DESCRIPTION - PAIN TYPE: TYPE: ACUTE PAIN

## 2022-10-17 ASSESSMENT — PAIN DESCRIPTION - ORIENTATION: ORIENTATION: RIGHT;LOWER

## 2022-10-17 ASSESSMENT — PAIN DESCRIPTION - DESCRIPTORS: DESCRIPTORS: CRUSHING

## 2022-10-17 ASSESSMENT — ENCOUNTER SYMPTOMS
NAUSEA: 0
VOMITING: 0
BACK PAIN: 0
DIARRHEA: 0
COUGH: 0
SHORTNESS OF BREATH: 0
ABDOMINAL PAIN: 1

## 2022-10-17 ASSESSMENT — PAIN SCALES - GENERAL: PAINLEVEL_OUTOF10: 10

## 2022-10-17 ASSESSMENT — PAIN DESCRIPTION - LOCATION: LOCATION: ABDOMEN

## 2022-10-18 ENCOUNTER — APPOINTMENT (OUTPATIENT)
Dept: CT IMAGING | Age: 52
End: 2022-10-18
Payer: COMMERCIAL

## 2022-10-18 VITALS
OXYGEN SATURATION: 94 % | TEMPERATURE: 97.2 F | WEIGHT: 180 LBS | BODY MASS INDEX: 25.77 KG/M2 | HEIGHT: 70 IN | HEART RATE: 48 BPM | SYSTOLIC BLOOD PRESSURE: 102 MMHG | DIASTOLIC BLOOD PRESSURE: 72 MMHG | RESPIRATION RATE: 16 BRPM

## 2022-10-18 LAB
BILIRUBIN URINE: NEGATIVE
COLOR: YELLOW
COMMENT UA: ABNORMAL
EKG ATRIAL RATE: 45 BPM
EKG P AXIS: 46 DEGREES
EKG P-R INTERVAL: 176 MS
EKG Q-T INTERVAL: 470 MS
EKG QRS DURATION: 88 MS
EKG QTC CALCULATION (BAZETT): 406 MS
EKG R AXIS: 9 DEGREES
EKG T AXIS: 29 DEGREES
EKG VENTRICULAR RATE: 45 BPM
GLUCOSE URINE: NEGATIVE
KETONES, URINE: NEGATIVE
LEUKOCYTE ESTERASE, URINE: NEGATIVE
NITRITE, URINE: NEGATIVE
PH UA: 6 (ref 5–8)
PROTEIN UA: NEGATIVE
SPECIFIC GRAVITY UA: 1.04 (ref 1–1.03)
TURBIDITY: CLEAR
URINE HGB: NEGATIVE
UROBILINOGEN, URINE: NORMAL

## 2022-10-18 PROCEDURE — 74177 CT ABD & PELVIS W/CONTRAST: CPT

## 2022-10-18 PROCEDURE — 2580000003 HC RX 258: Performed by: EMERGENCY MEDICINE

## 2022-10-18 PROCEDURE — 6360000002 HC RX W HCPCS: Performed by: EMERGENCY MEDICINE

## 2022-10-18 PROCEDURE — 6360000004 HC RX CONTRAST MEDICATION: Performed by: EMERGENCY MEDICINE

## 2022-10-18 PROCEDURE — 93005 ELECTROCARDIOGRAM TRACING: CPT | Performed by: EMERGENCY MEDICINE

## 2022-10-18 PROCEDURE — 93010 ELECTROCARDIOGRAM REPORT: CPT | Performed by: INTERNAL MEDICINE

## 2022-10-18 RX ORDER — SODIUM CHLORIDE 0.9 % (FLUSH) 0.9 %
10 SYRINGE (ML) INJECTION PRN
Status: DISCONTINUED | OUTPATIENT
Start: 2022-10-18 | End: 2022-10-18 | Stop reason: HOSPADM

## 2022-10-18 RX ORDER — 0.9 % SODIUM CHLORIDE 0.9 %
100 INTRAVENOUS SOLUTION INTRAVENOUS ONCE
Status: COMPLETED | OUTPATIENT
Start: 2022-10-18 | End: 2022-10-18

## 2022-10-18 RX ADMIN — SODIUM CHLORIDE 1000 ML: 9 INJECTION, SOLUTION INTRAVENOUS at 00:05

## 2022-10-18 RX ADMIN — HYDROMORPHONE HYDROCHLORIDE 1 MG: 1 INJECTION, SOLUTION INTRAMUSCULAR; INTRAVENOUS; SUBCUTANEOUS at 00:07

## 2022-10-18 RX ADMIN — SODIUM CHLORIDE 100 ML: 9 INJECTION, SOLUTION INTRAVENOUS at 00:54

## 2022-10-18 RX ADMIN — IOPAMIDOL 75 ML: 755 INJECTION, SOLUTION INTRAVENOUS at 00:54

## 2022-10-18 RX ADMIN — SODIUM CHLORIDE, PRESERVATIVE FREE 10 ML: 5 INJECTION INTRAVENOUS at 00:54

## 2022-10-18 ASSESSMENT — PAIN DESCRIPTION - DESCRIPTORS: DESCRIPTORS: ACHING

## 2022-10-18 ASSESSMENT — PAIN SCALES - GENERAL: PAINLEVEL_OUTOF10: 9

## 2022-10-18 ASSESSMENT — PAIN DESCRIPTION - LOCATION: LOCATION: ABDOMEN

## 2022-10-18 NOTE — ED PROVIDER NOTES
[address]  EMERGENCY DEPARTMENT ENCOUNTER      Pt Name: Lyla Rodriguez  MRN: [id number]  [place] 1970  Date of evaluation: 10/17/22      CHIEF COMPLAINT       Chief Complaint   Patient presents with    Abdominal Pain     RLQ     HISTORY OF PRESENT ILLNESS   HPI 45 y.o. male presents with c/o abdominal pain. Symptoms started suddenly 2 hours ago. Pain is described as sharp in character, severe in severity (rating it 10 / 10). The pain is located primarily in the umbilical area with  radiation. The pain has been constant in course. The patient tried tums  and peptobismol prior to arrival with minimal relief of symptoms. The patient denies a history of similar symptoms. Rayshawn Patella REVIEW OF SYSTEMS       Review of Systems   Constitutional:  Negative for fever. HENT:  Negative for congestion. Eyes:  Negative for visual disturbance. Respiratory:  Negative for cough and shortness of breath. Cardiovascular:  Negative for chest pain. Gastrointestinal:  Positive for abdominal pain. Negative for diarrhea, nausea and vomiting. Genitourinary:  Negative for difficulty urinating. Musculoskeletal:  Negative for back pain. Skin:  Negative for rash. Neurological:  Negative for headaches. Psychiatric/Behavioral:  Negative for confusion. PAST MEDICAL HISTORY     Past Medical History:   Diagnosis Date    GERD (gastroesophageal reflux disease)     Hiatal hernia        SURGICAL HISTORY     No past surgical history on file.     CURRENT MEDICATIONS       Discharge Medication List as of 10/18/2022  2:42 AM        CONTINUE these medications which have NOT CHANGED    Details   escitalopram (LEXAPRO) 20 MG tablet Take 1 tablet by mouth daily, Disp-30 tablet, R-3Normal      rosuvastatin (CRESTOR) 40 MG tablet Take 1 tablet by mouth daily, Disp-90 tablet, R-1Normal      traZODone (DESYREL) 50 MG tablet Take 1 tablet by mouth nightly, Disp-30 tablet, R-0Normal      diclofenac sodium (VOLTAREN) 1 % GEL Apply quite some time. Urinalysis does not look infected. Pt provided analgesics, with improvement of his symptoms. After morhpine pt was resting and his HR went into 40s. An EKG showed a sinus bradycardia. When the patient was woken up and speaking to him his HR went up to 60's. No syncopal / presyncopal symptoms. D/w pt the results, treatment plan, warning precautions for prompt ED return and importance of close OP FU, he verbalizes understanding and agrees with the treatment plan. DIAGNOSTIC RESULTS     EKG: All EKG's are interpreted by the Emergency Department Physician who either signs or Co-signs this chart in the absence of a cardiologist.    EKG shows a sinus bradycardia rhythm. HR is 45, , QRS 88, , no KAREN, No STD, No TWI, the axis is normal.        RADIOLOGY:All plain film, CT, MRI, and formal ultrasound images (except ED bedside ultrasound) are read by the radiologist and the images and interpretations are directly viewed by the emergency physician. CT ABDOMEN PELVIS W IV CONTRAST Additional Contrast? None   Final Result   Partially visualized moderate sized right hydrocele. Correlate clinically   and consider scrotal ultrasound. Otherwise unremarkable exam.             LABS: All lab results were reviewed by myself, and all abnormals are listed below.   Labs Reviewed   CBC WITH AUTO DIFFERENTIAL - Abnormal; Notable for the following components:       Result Value    Monocytes 8 (*)     All other components within normal limits   COMPREHENSIVE METABOLIC PANEL - Abnormal; Notable for the following components:    Glucose 150 (*)     ALT 59 (*)     AST 41 (*)     All other components within normal limits   URINALYSIS WITH REFLEX TO CULTURE - Abnormal; Notable for the following components:    Specific Gravity, UA 1.043 (*)     All other components within normal limits   LIPASE   LACTIC ACID       EMERGENCY DEPARTMENT COURSE:   Vitals:    Vitals:    10/18/22 0018 10/18/22 0019 10/18/22 0147 10/18/22 0237   BP:   103/72 102/72   Pulse:   (!) 44 (!) 48   Resp: 16  16 16   Temp:       TempSrc:       SpO2: (!) 89% 96% 93% 94%   Weight:       Height:           The patient was given the following medications while in the emergency department:  Orders Placed This Encounter   Medications    HYDROmorphone (DILAUDID) injection 1 mg    0.9 % sodium chloride bolus    0.9 % sodium chloride bolus    iopamidol (ISOVUE-370) 76 % injection 75 mL    DISCONTD: sodium chloride flush 0.9 % injection 10 mL     -------------------------  CRITICAL CARE:   CONSULTS: None  PROCEDURES: Procedures     FINAL IMPRESSION      1. Periumbilical abdominal pain    2.  Bradycardia          DISPOSITION/PLAN   DISPOSITION Decision To Discharge 10/18/2022 02:41:42 AM      PATIENT REFERRED TO:  MARIELLE Augustin - CNP  [address] [facility]  [address] [facility],[address]  [address] [phone] 73 [phone]    In 2 days      [facility] ED  [facility] [address]  [address] [facility]  [phone]    If symptoms worsen    DISCHARGE MEDICATIONS:  Discharge Medication List as of 10/18/2022  2:42 AM            Flaquita Pacheco MD  Attending Emergency Physician                      Flaquita Pacheco MD  10/18/22 2680

## 2022-10-18 NOTE — ED TRIAGE NOTES
Pt to triage c/o RLQ abdominal pain that started about 2 hours ago. Pt states \"I took a TUMS and that did not help. \" Pt admits to having a hard time urinating. Denies nausea/vomiting/diarrhea. Pt in distress at bedside, restless.

## 2022-10-20 ENCOUNTER — HOSPITAL ENCOUNTER (OUTPATIENT)
Age: 52
Discharge: HOME | End: 2022-10-22
Payer: COMMERCIAL

## 2022-10-20 DIAGNOSIS — R00.1 BRADYCARDIA: ICD-10-CM

## 2022-10-20 DIAGNOSIS — R94.31 ABNORMAL EKG: ICD-10-CM

## 2022-10-20 PROCEDURE — 93005 ELECTROCARDIOGRAM TRACING: CPT

## 2022-10-21 LAB
EKG ATRIAL RATE: 46 BPM
EKG P AXIS: 77 DEGREES
EKG P-R INTERVAL: 170 MS
EKG Q-T INTERVAL: 442 MS
EKG QRS DURATION: 96 MS
EKG QTC CALCULATION (BAZETT): 386 MS
EKG R AXIS: 68 DEGREES
EKG T AXIS: 64 DEGREES
EKG VENTRICULAR RATE: 46 BPM

## 2022-10-21 PROCEDURE — 93010 ELECTROCARDIOGRAM REPORT: CPT | Performed by: INTERNAL MEDICINE

## 2022-12-07 ENCOUNTER — HOSPITAL ENCOUNTER (OUTPATIENT)
Dept: ULTRASOUND IMAGING | Age: 52
Discharge: HOME | End: 2022-12-09
Payer: COMMERCIAL

## 2022-12-07 DIAGNOSIS — N43.3 HYDROCELE, UNSPECIFIED HYDROCELE TYPE: ICD-10-CM

## 2022-12-07 PROCEDURE — 93976 VASCULAR STUDY: CPT

## 2023-01-16 ENCOUNTER — OFFICE VISIT (OUTPATIENT)
Dept: UROLOGY | Age: 53
End: 2023-01-16
Payer: COMMERCIAL

## 2023-01-16 VITALS
SYSTOLIC BLOOD PRESSURE: 120 MMHG | DIASTOLIC BLOOD PRESSURE: 80 MMHG | TEMPERATURE: 97.8 F | RESPIRATION RATE: 16 BRPM | WEIGHT: 186 LBS | BODY MASS INDEX: 26.63 KG/M2 | HEART RATE: 76 BPM | HEIGHT: 70 IN

## 2023-01-16 DIAGNOSIS — N50.811 RIGHT TESTICULAR PAIN: ICD-10-CM

## 2023-01-16 DIAGNOSIS — N43.3 HYDROCELE, UNSPECIFIED HYDROCELE TYPE: Primary | ICD-10-CM

## 2023-01-16 PROCEDURE — 99204 OFFICE O/P NEW MOD 45 MIN: CPT | Performed by: UROLOGY

## 2023-01-16 ASSESSMENT — ENCOUNTER SYMPTOMS
VOMITING: 0
ABDOMINAL PAIN: 0
EYE PAIN: 0
NAUSEA: 0
WHEEZING: 0
COUGH: 0
DIARRHEA: 0
CONSTIPATION: 0
SHORTNESS OF BREATH: 0
BACK PAIN: 0

## 2023-01-16 NOTE — PROGRESS NOTES
[address] [address]  Dept: [phone]  Dept Fax: [address] [facility] Urology Office Note - New patient    Patient:  Bobby Blizzard  YOB: 1970  Date: 1/16/2023    The patient is a 45 y.o. male who presents todayfor evaluation of the following problems:   Chief Complaint   Patient presents with    New Patient     hydrocele    referred by MARIELLE Castaneda CNP. HPI  This is a 77-year-old gentleman who has a right hydrocele. He has had discomfort in his right hemiscrotum for some time but has gotten worse over the last several months. 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 He did have a scrotal ultrasound which shows a moderate right hydrocele. (Patient's old records have been requested, reviewed and summarized in today's note.)    Summary of old records: N/A    Additional History: N/A    Procedures Today: N/A    Last several PSA's:  Lab Results   Component Value Date    PSA 1.11 01/05/2022    PSA 1.94 02/24/2020    PSA 1.15 04/05/2019     Last total testosterone:  No results found for: TESTOSTERONE  Urinalysis today:  No results found for this visit on 01/16/23. AUA Symptom Score (1/16/2023): Last BUN and creatinine:  Lab Results   Component Value Date    BUN 17 10/17/2022     Lab Results   Component Value Date    CREATININE 0.82 10/17/2022       Additional Lab/Culture results: none    Imaging Reviewed during this Office Visit: none  (results were independently reviewed by physician and radiology report verified)    PAST MEDICAL, FAMILY AND SOCIAL HISTORY:  Past Medical History:   Diagnosis Date    GERD (gastroesophageal reflux disease)     Hiatal hernia      No past surgical history on file.   Family History   Problem Relation Age of Onset    No Known Problems Mother     No Known Problems Father     No Known Problems Sister     No Known Problems Brother     No Known Problems Brother     No Known Problems Sister      Outpatient Medications Marked as Taking for the 1/16/23 encounter (Office Visit) with Felicia Rhoades MD   Medication Sig Dispense Refill    escitalopram (LEXAPRO) 20 MG tablet Take 1 tablet by mouth daily 30 tablet 3    rosuvastatin (CRESTOR) 40 MG tablet Take 1 tablet by mouth daily 90 tablet 1    traZODone (DESYREL) 50 MG tablet Take 1 tablet by mouth nightly 30 tablet 0    diclofenac sodium (VOLTAREN) 1 % GEL Apply topically 2 times daily 150 g 0    meloxicam (MOBIC) 15 MG tablet Take 1 tablet by mouth daily 30 tablet 3        Patient has no known allergies. Social History     Tobacco Use   Smoking Status Never   Smokeless Tobacco Never      (If patient a smoker, smoking cessation counseling offered)   Social History     Substance and Sexual Activity   Alcohol Use No       REVIEW OF SYSTEMS:  Review of Systems    Physical Exam:    This a 45 y.o. male   Vitals:    01/16/23 1512   BP: 120/80   Pulse: 76   Resp: 16   Temp: 97.8 °F (36.6 °C)     Body mass index is 26.69 kg/m². Physical Exam  Constitutional: Patient in no acute distress. Neuro: Alert and oriented to person, place and time. Psych: Mood normal, affect normal  Skin: No rash noted  HEENT: Head: Normocephalic and atraumatic  Conjunctivae and EOM are normal. Pupils are equal, round  Nose: Normal  Right External Ear: Normal; Left External Ear: Normal  Mouth: Mucosa Moist  Neck: Supple  Lungs:Respiratory effort is normal  Cardiovascular: Warm & Pink  Abdomen: Soft, non-tender, non-distendedwith no CVA,  No flank tenderness,  Orhepatosplenomegaly   Lymphatics: No palpable lymphadenopathy. Bladder non-tender and not distended. Musculoskeletal: Normal gait and station  Penis normal and circumcised  Urethral meatus normal  Scrotum: right hydrocele      Assessment and Plan      1. Hydrocele, unspecified hydrocele type    2.  Right testicular pain Plan: right hydrocelectomy       Prescriptions Ordered:  No orders of the defined types were placed in this encounter. Orders Placed:  No orders of the defined types were placed in this encounter. Douglas Douglas MD    Agree with the ROS entered by the MA.

## 2023-01-17 ENCOUNTER — HOSPITAL ENCOUNTER (OUTPATIENT)
Age: 53
Setting detail: SPECIMEN
Discharge: HOME | End: 2023-01-17
Payer: COMMERCIAL

## 2023-01-17 LAB
THYROXINE, FREE: 0.9 NG/DL (ref 0.93–1.7)
TSH SERPL DL<=0.05 MIU/L-ACNC: 0.2 UIU/ML (ref 0.3–5)

## 2023-01-17 PROCEDURE — 84443 ASSAY THYROID STIM HORMONE: CPT

## 2023-01-17 PROCEDURE — 36415 COLL VENOUS BLD VENIPUNCTURE: CPT

## 2023-01-17 PROCEDURE — 84439 ASSAY OF FREE THYROXINE: CPT

## 2023-01-24 ENCOUNTER — TELEPHONE (OUTPATIENT)
Dept: UROLOGY | Age: 53
End: 2023-01-24

## 2023-01-24 NOTE — TELEPHONE ENCOUNTER
Attempted to contact patient for scheduling. Left voicemail for patient to call back for scheduling.

## 2023-01-26 NOTE — TELEPHONE ENCOUNTER
Pt called asking when he can be scheduled for surgery. Writer informed patient surgery is returning phone calls as fast as she can and to call the office in 2 weeks if still no phone call. Patient verbalized understanding and call was ended.

## 2023-01-27 ENCOUNTER — TELEPHONE (OUTPATIENT)
Dept: UROLOGY | Age: 53
End: 2023-01-27

## 2023-01-27 NOTE — TELEPHONE ENCOUNTER
(RT) Hydrocelectomy @ [facility] 4/7/23 7:30am **STOP BLOOD THINNERS 3/31/23**   PAT @ [facility] 3/23/23 8:00am   Teaching appt @ office 3/23/23 8:00am (patient had multiple questions)         Spoke with patient procedure info emailed.

## 2023-01-30 ENCOUNTER — TELEPHONE (OUTPATIENT)
Dept: UROLOGY | Age: 53
End: 2023-01-30

## 2023-01-30 NOTE — TELEPHONE ENCOUNTER
Pt contacted office, he is looking for surgery line and states \"I have been calling all day and no one has called me back. I want to reschedule my surgery from the 7th to Feb 14th. \" Writer informed patient we are in clinic and surgery will contact him as soon as they can.

## 2023-03-21 RX ORDER — SODIUM CHLORIDE, SODIUM LACTATE, POTASSIUM CHLORIDE, CALCIUM CHLORIDE 600; 310; 30; 20 MG/100ML; MG/100ML; MG/100ML; MG/100ML
1000 INJECTION, SOLUTION INTRAVENOUS CONTINUOUS
OUTPATIENT
Start: 2023-03-21

## 2023-03-21 NOTE — DISCHARGE INSTRUCTIONS
medications you take, along with the dose of the medications and how often you take it. If more convenient bring the pharmacy bottles in a zip lock bag. ·Brush your teeth but do not swallow water. ·Bring your eyeglasses and case with you. No contacts are to be worn the day of surgery. You also may bring your hearing aids. ·Do not bring any valuables, such as jewelry, cash or credit cards. If you are staying overnight with us, please bring a SMALL bag of personal items. We cannot accommodate large items, like suitcases. ·If your child is having surgery please make arrangements for any other children to be cared for at home on the day of surgery. Other children are not permitted in recovery room and we want you to be able to spend time with the patient. If other arrangements are not available then we suggest that you have a second adult to stay in the waiting room. ·If you are having any type of anesthesia you are to have nothing to eat or drink after midnight the night before your surgery. This includes gum, mints, water or smoking or chewing tobacco.  The only exception to this is a small sip of water to take with any morning dose of heart, blood pressure, or seizure medications. ·Bring your inhaler if you are currently using one. ·Bring your blood band if one has been given to you. Please do not close the clasp. ·If you are on C-PAP or Bi-PAP at home and plan on staying in the hospital overnight for your surgery please bring the machine with you. ·Do not wear any jewelry or body piercings day of surgery. Also, NO lotion, perfume or deodorant to be used the day of surgery. If you have any other questions regarding your procedure/surgery please call  your surgeon's office.      If you have a last minute question(s) the DAY OF your surgery, you may call [phone]

## 2023-03-23 ENCOUNTER — HOSPITAL ENCOUNTER (OUTPATIENT)
Dept: PREADMISSION TESTING | Age: 53
Discharge: HOME | End: 2023-03-23
Payer: COMMERCIAL

## 2023-03-23 ENCOUNTER — TELEPHONE (OUTPATIENT)
Dept: UROLOGY | Age: 53
End: 2023-03-23

## 2023-03-23 VITALS
SYSTOLIC BLOOD PRESSURE: 113 MMHG | BODY MASS INDEX: 27.99 KG/M2 | WEIGHT: 189 LBS | DIASTOLIC BLOOD PRESSURE: 71 MMHG | HEART RATE: 57 BPM | RESPIRATION RATE: 18 BRPM | HEIGHT: 69 IN | TEMPERATURE: 97.6 F | OXYGEN SATURATION: 99 %

## 2023-03-23 LAB
BUN SERPL-MCNC: 17 MG/DL (ref 6–20)
CREAT SERPL-MCNC: 0.79 MG/DL (ref 0.7–1.2)
EKG ATRIAL RATE: 50 BPM
EKG P AXIS: 61 DEGREES
EKG P-R INTERVAL: 172 MS
EKG Q-T INTERVAL: 428 MS
EKG QRS DURATION: 106 MS
EKG QTC CALCULATION (BAZETT): 390 MS
EKG R AXIS: 3 DEGREES
EKG T AXIS: 34 DEGREES
EKG VENTRICULAR RATE: 50 BPM
GFR SERPL CREATININE-BSD FRML MDRD: >60 ML/MIN/1.73M2
GLUCOSE SERPL-MCNC: 107 MG/DL (ref 70–99)

## 2023-03-23 PROCEDURE — 36415 COLL VENOUS BLD VENIPUNCTURE: CPT

## 2023-03-23 PROCEDURE — 87086 URINE CULTURE/COLONY COUNT: CPT

## 2023-03-23 PROCEDURE — 82565 ASSAY OF CREATININE: CPT

## 2023-03-23 PROCEDURE — 93005 ELECTROCARDIOGRAM TRACING: CPT | Performed by: ANESTHESIOLOGY

## 2023-03-23 PROCEDURE — 84520 ASSAY OF UREA NITROGEN: CPT

## 2023-03-23 PROCEDURE — 82947 ASSAY GLUCOSE BLOOD QUANT: CPT

## 2023-03-23 PROCEDURE — 93010 ELECTROCARDIOGRAM REPORT: CPT | Performed by: INTERNAL MEDICINE

## 2023-03-23 NOTE — TELEPHONE ENCOUNTER
Patient walked into the office having some questions about hydrocelectomy. He was under the impression surgery was small and no recovery period. He was planning on going to [facility] for an incentive trip for work the following week. I explained post-op expectations of patient including recovery period, lifting restrictions, wound care, ice/elevation. I do not recommend travel outside of the [place] the following week. I thoroughly reviewed my concerns if post-op complications or infection should occur. In additional, while incision is healing- would not recommend submerging self in water. Patient very irritated. He states he is going to keep the surgery as scheduled, he is going to try to work something else out with his boss to go on vacation several weeks after surgery.

## 2023-03-23 NOTE — H&P
father, mother, sister, and sister. Review of Systems:  CONSTITUTIONAL:   negative for fevers, chills, fatigue and malaise    EYES:   negative for double vision, blurred vision and photophobia    HEENT:   negative for tinnitus, epistaxis and sore throat     RESPIRATORY:   negative for cough, shortness of breath, wheezing     CARDIOVASCULAR:   negative for chest pain, palpitations, syncope, edema     GASTROINTESTINAL:   negative for nausea, vomiting     GENITOURINARY:   negative for incontinence right scrotal discomfort, edema   MUSCULOSKELETAL:   negative for neck or back pain     NEUROLOGICAL:   Negative for weakness and tingling  negative for headaches and dizziness     PSYCHIATRIC:   negative for anxiety       OBJECTIVE:   VITALS:  height is 5' 9\" (1.753 m) and weight is 189 lb (85.7 kg). His temporal temperature is 97.6 °F (36.4 °C). His blood pressure is 113/71 and his pulse is 57. His respiration is 18 and oxygen saturation is 99%. CONSTITUTIONAL:alert & oriented x 3, no acute distress. Pleasant but slightly anxious. SKIN:  Warm and dry, no rashes to exposed areas of skin. HEAD:  Normocephalic, atraumatic. EYES: PERRL. EOMs intact. EARS:  Intact and equal bilaterally. Hearing grossly WNL. NOSE:  Nares patent. No rhinorrhea   MOUTH/THROAT:  Mucous membranes pink and moist, teeth appear to be intact. NECK:supple, good ROM. LUNGS: Respirations even and non-labored. Clear to auscultation bilaterally, no wheezes, rales, or rhonchi. CARDIOVASCULAR: Regular rate and rhythm, bradycardic, no murmurs. ABDOMEN: soft, non-tender, non-distended, bowel sounds active x 4   EXTREMITIES: No edema to bilateral lower extremities. No varicosities to bilateral lower extremities. NEUROLOGIC: CN II-XII are grossly intact. Gait is smooth. Testing:   EKG: 3/23/2023  Labs pending: drawn 3/23/2023   IMPRESSIONS:   Right hydrocele. PLANS:   HYDROCELECTOMY - Right.     Jennetta Krabbe, APRN -

## 2023-03-23 NOTE — PROGRESS NOTES
Anesthesia Focused Assessment    Hx of anesthesia complications:  no  Family hx of anesthesia complications:  no      Tested positive for Covid-19 in last 8 weeks: no      STOP-BANG Sleep Apnea Questionnaire    SNORE loudly (heard through closed doors)? Yes  TIRED, fatigued, sleepy during daytime? No  OBSERVED stopping breathing during sleep? No  High blood PRESSURE or being treated? No    BMI over 35? No  AGE over 48? Yes  NECK circumference over 16\"? No  GENDER (male)? Yes             Total 3  High risk 5-8  Intermediate risk 3-4  Low risk 0-2    ----------------------------------------------------------------------------------------------------------------------  RICKIE                              No  If yes, machine? DM1                                            No  DM2                   No    Coronary Artery Disease      No  HTN         No  Defib/AICD/Pacemaker               No             Renal Failure                   No  If yes, on dialysis           Active smoker? No  Drinks alcohol? No  Illicit drugs? No  Dentition?        benign      Past Medical History:   Diagnosis Date    Bradycardia     patient is a runner    Colon polyp     GERD (gastroesophageal reflux disease)     Hiatal hernia     Hydrocele 03/2023    right    Hyperlipidemia     Under care of service provider 03/23/2023    pcp-marty saleh-last visit march 2023         Patient was evaluated in PAT & anesthesia guidelines were applied. NPO guidelines, medication instructions and scheduled arrival time were reviewed with patient. Advised patient or guardian to please notify surgeon's office if patient or child becomes ill prior to surgery.                                                                                                                         Anesthesia contacted:  no    Patient with history of bradycardia, intermediate stress test in 2020 due to decreased EF of 47%. Patient had visit with cardiology at this time in 2020 and also most recently 1/2023, both notes in physical chart. Most recent echo from 1/2023 pending, will obtain copy for chart. PAT EKG today is abnormal without interval change. Patient denies any cardiopulmonary complaints, states he is active, runs 3 miles every other day.     Medical or cardiac clearance ordered: will request formal clearance from cardiology prior to procedure and copy of most recent echo    MARIELLE Lr CNP   3/23/23  4:22 PM

## 2023-03-24 LAB
MICROORGANISM SPEC CULT: NO GROWTH
SPECIMEN DESCRIPTION: NORMAL

## 2023-04-04 ENCOUNTER — TELEPHONE (OUTPATIENT)
Dept: UROLOGY | Age: 53
End: 2023-04-04

## 2023-04-04 NOTE — TELEPHONE ENCOUNTER
Procedure time change for 4/14/23   The procedure time has been moved to 9:00am, 7:00am arrival.   Left voicemail for patient with need procedure time and arrival.

## 2023-04-12 NOTE — H&P
Urology History and Physical    Patient:  Sandra Cottrell  MRN: [id number]  YOB: 1970    CHIEF COMPLAINT: Right hydrocele    HISTORY OF PRESENT ILLNESS:   The patient is a 52 y.o. male    He has had discomfort in his right hemiscrotum for some time but has gotten worse over the last several months. 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 1.  Normal testicles with normal Doppler blood flow. No evidence of   testicular torsion, mass or orchitis. 2.  No evidence of epididymitis. 3.  Moderate-sized simple right scrotal hydrocele is nonspecific. 4.  Simple appearing 0.3 cm left epididymal cyst is likely benign. Patient's old records, notes and chart reviewed and summarized above. Past Medical History:    Past Medical History:   Diagnosis Date    Bradycardia     patient is a runner    Colon polyp     GERD (gastroesophageal reflux disease)     Hiatal hernia     Hydrocele 03/2023    right    Hyperlipidemia     Under care of service provider 03/23/2023    pcp-marty saleh-last visit march 2023       Past Surgical History:    Past Surgical History:   Procedure Laterality Date    COLONOSCOPY      ENDOSCOPY, COLON, DIAGNOSTIC         Medications:    Scheduled Meds:  Continuous Infusions:  PRN Meds:. Allergies:  Patient has no known allergies.     Social History:    Social History     Socioeconomic History    Marital status:      Spouse name: Not on file    Number of children: Not on file    Years of education: Not on file    Highest education level: Not on file   Occupational History    Not on file   Tobacco Use    Smoking status: Never    Smokeless tobacco: Never   Vaping Use    Vaping Use: Never used   Substance and Sexual Activity    Alcohol use: No    Drug use: No    Sexual activity: Not on file   Other Topics Concern    Not on file   Social History Narrative    Not on file     Social Determinants of Health     Financial Resource

## 2023-04-14 ENCOUNTER — HOSPITAL ENCOUNTER (OUTPATIENT)
Age: 53
Setting detail: OUTPATIENT SURGERY
Discharge: HOME | End: 2023-04-14
Attending: UROLOGY | Admitting: UROLOGY
Payer: COMMERCIAL

## 2023-04-14 VITALS
SYSTOLIC BLOOD PRESSURE: 100 MMHG | HEART RATE: 43 BPM | RESPIRATION RATE: 14 BRPM | TEMPERATURE: 96.8 F | DIASTOLIC BLOOD PRESSURE: 65 MMHG | OXYGEN SATURATION: 93 %

## 2023-04-14 DIAGNOSIS — G89.18 POST-OP PAIN: Primary | ICD-10-CM

## 2023-04-14 PROCEDURE — 3700000000 HC ANESTHESIA ATTENDED CARE: Performed by: UROLOGY

## 2023-04-14 PROCEDURE — 6360000002 HC RX W HCPCS: Performed by: ANESTHESIOLOGY

## 2023-04-14 PROCEDURE — 7100000011 HC PHASE II RECOVERY - ADDTL 15 MIN: Performed by: UROLOGY

## 2023-04-14 PROCEDURE — 2500000003 HC RX 250 WO HCPCS: Performed by: UROLOGY

## 2023-04-14 PROCEDURE — 2580000003 HC RX 258: Performed by: UROLOGY

## 2023-04-14 PROCEDURE — 2709999900 HC NON-CHARGEABLE SUPPLY: Performed by: UROLOGY

## 2023-04-14 PROCEDURE — 3700000001 HC ADD 15 MINUTES (ANESTHESIA): Performed by: UROLOGY

## 2023-04-14 PROCEDURE — 3600000014 HC SURGERY LEVEL 4 ADDTL 15MIN: Performed by: UROLOGY

## 2023-04-14 PROCEDURE — 7100000010 HC PHASE II RECOVERY - FIRST 15 MIN: Performed by: UROLOGY

## 2023-04-14 PROCEDURE — 7100000001 HC PACU RECOVERY - ADDTL 15 MIN: Performed by: UROLOGY

## 2023-04-14 PROCEDURE — 7100000000 HC PACU RECOVERY - FIRST 15 MIN: Performed by: UROLOGY

## 2023-04-14 PROCEDURE — 3600000004 HC SURGERY LEVEL 4 BASE: Performed by: UROLOGY

## 2023-04-14 RX ORDER — OXYCODONE HYDROCHLORIDE 5 MG/1
5 TABLET ORAL EVERY 6 HOURS PRN
Qty: 12 TABLET | Refills: 0 | Status: SHIPPED | OUTPATIENT
Start: 2023-04-14 | End: 2023-04-17

## 2023-04-14 RX ORDER — ONDANSETRON 2 MG/ML
4 INJECTION INTRAMUSCULAR; INTRAVENOUS
Status: DISCONTINUED | OUTPATIENT
Start: 2023-04-14 | End: 2023-04-14 | Stop reason: HOSPADM

## 2023-04-14 RX ORDER — LABETALOL HYDROCHLORIDE 5 MG/ML
10 INJECTION, SOLUTION INTRAVENOUS
Status: DISCONTINUED | OUTPATIENT
Start: 2023-04-14 | End: 2023-04-14 | Stop reason: HOSPADM

## 2023-04-14 RX ORDER — BUPIVACAINE HYDROCHLORIDE 2.5 MG/ML
INJECTION, SOLUTION INFILTRATION; PERINEURAL PRN
Status: DISCONTINUED | OUTPATIENT
Start: 2023-04-14 | End: 2023-04-14 | Stop reason: ALTCHOICE

## 2023-04-14 RX ORDER — SODIUM CHLORIDE 0.9 % (FLUSH) 0.9 %
5-40 SYRINGE (ML) INJECTION PRN
Status: DISCONTINUED | OUTPATIENT
Start: 2023-04-14 | End: 2023-04-14 | Stop reason: HOSPADM

## 2023-04-14 RX ORDER — SODIUM CHLORIDE 0.9 % (FLUSH) 0.9 %
5-40 SYRINGE (ML) INJECTION EVERY 12 HOURS SCHEDULED
Status: DISCONTINUED | OUTPATIENT
Start: 2023-04-14 | End: 2023-04-14 | Stop reason: HOSPADM

## 2023-04-14 RX ORDER — HYDRALAZINE HYDROCHLORIDE 20 MG/ML
10 INJECTION INTRAMUSCULAR; INTRAVENOUS
Status: DISCONTINUED | OUTPATIENT
Start: 2023-04-14 | End: 2023-04-14 | Stop reason: HOSPADM

## 2023-04-14 RX ORDER — MAGNESIUM HYDROXIDE 1200 MG/15ML
LIQUID ORAL CONTINUOUS PRN
Status: DISCONTINUED | OUTPATIENT
Start: 2023-04-14 | End: 2023-04-14 | Stop reason: HOSPADM

## 2023-04-14 RX ORDER — SODIUM CHLORIDE 9 MG/ML
INJECTION, SOLUTION INTRAVENOUS PRN
Status: DISCONTINUED | OUTPATIENT
Start: 2023-04-14 | End: 2023-04-14 | Stop reason: HOSPADM

## 2023-04-14 RX ORDER — SODIUM CHLORIDE, SODIUM LACTATE, POTASSIUM CHLORIDE, CALCIUM CHLORIDE 600; 310; 30; 20 MG/100ML; MG/100ML; MG/100ML; MG/100ML
1000 INJECTION, SOLUTION INTRAVENOUS CONTINUOUS
Status: DISCONTINUED | OUTPATIENT
Start: 2023-04-14 | End: 2023-04-14 | Stop reason: HOSPADM

## 2023-04-14 RX ORDER — CEFADROXIL 500 MG/1
500 CAPSULE ORAL 2 TIMES DAILY
Qty: 10 CAPSULE | Refills: 0 | Status: SHIPPED | OUTPATIENT
Start: 2023-04-14 | End: 2023-04-19

## 2023-04-14 RX ADMIN — HYDROMORPHONE HYDROCHLORIDE 0.25 MG: 1 INJECTION, SOLUTION INTRAMUSCULAR; INTRAVENOUS; SUBCUTANEOUS at 11:42

## 2023-04-14 RX ADMIN — HYDROMORPHONE HYDROCHLORIDE 0.5 MG: 1 INJECTION, SOLUTION INTRAMUSCULAR; INTRAVENOUS; SUBCUTANEOUS at 11:23

## 2023-04-14 RX ADMIN — HYDROMORPHONE HYDROCHLORIDE 0.5 MG: 1 INJECTION, SOLUTION INTRAMUSCULAR; INTRAVENOUS; SUBCUTANEOUS at 11:35

## 2023-04-14 ASSESSMENT — PAIN DESCRIPTION - LOCATION
LOCATION: GROIN

## 2023-04-14 ASSESSMENT — PAIN DESCRIPTION - DESCRIPTORS: DESCRIPTORS: OTHER (COMMENT)

## 2023-04-14 ASSESSMENT — PAIN SCALES - GENERAL
PAINLEVEL_OUTOF10: 8
PAINLEVEL_OUTOF10: 7
PAINLEVEL_OUTOF10: 6
PAINLEVEL_OUTOF10: 3
PAINLEVEL_OUTOF10: 4

## 2023-04-14 ASSESSMENT — PAIN - FUNCTIONAL ASSESSMENT: PAIN_FUNCTIONAL_ASSESSMENT: 0-10

## 2023-04-14 ASSESSMENT — PAIN DESCRIPTION - ORIENTATION
ORIENTATION: RIGHT

## 2023-04-14 ASSESSMENT — PAIN DESCRIPTION - PAIN TYPE: TYPE: SURGICAL PAIN

## 2023-04-14 NOTE — OP NOTE
Operative Note      Patient: Shakeel Bravo  YOB: 1970  MRN: [id number]    Date of Procedure: 4/14/2023    Pre-Op Diagnosis Codes:     * Right hydrocele [N43.3]    Post-Op Diagnosis: Same       Procedure(s): HYDROCELECTOMY    Surgeon(s):  Clark Maria MD    Assistant:   Resident: Maurisio Aguayo MD PGY4    Anesthesia: General    Estimated Blood Loss (mL): Minimal    Complications: None    Specimens:   * No specimens in log *    Implants:  * No implants in log *      Drains: * No LDAs found *    Findings:   Small right-sided hydrocele successfully evacuated and reduce with excision of the tunica vaginalis  Scarring of the tunica vaginalis to the testicle      Indication for procedure:  Patient is a 49-year-old male with a right-sided hydrocele presents for the aforementioned procedure. H&P was reviewed, informed consent was obtained, patient understood risk, benefits, alternatives of the procedure and wished to proceed. Operative detail:  Patient was brought to the operative suite and placed on continuous pulse symmetry and cardiac monitoring anesthesia. IV antibiotics including 2 g of Ancef was administered. He was then placed in the supine position and prepped and draped in the normal sterile fashion. Timeout performed confirming patient, procedure, side, all in the room agreed. We began by marking approximately a 2 cm transverse scrotal incision over the right hemiscrotum. We then meticulously dissected through the dartos onto the tunica vaginalis. We then obtained a 16-gauge needle and aspirated the hydrocele fluid. This was approximately 60 cc. Once this was done the testicle was delivered and we excised the tunica vaginalis circumferentially. We noted that at the posterior base of the testicle had adhered tunica vaginalis. Once this was done we placed an interrupted 3-0 Vicryl at the superior portion of the tunica vaginalis incision.   Once this was done hemostasis was

## 2023-04-14 NOTE — DISCHARGE INSTRUCTIONS
General Discharge Instructions: Please place ice on scrotum for 15 minutes intervals beginning in PACU    Patient ok to discharge home in good condition  Scrotal support for 2 weeks  No heavy lifting, >10 lbs for 2 weeks, or until cleared by attending physician  Patient should avoid strenuous activity for 4-6 weeks  Patient should walk moderately at home 8-10x per day or every hour   Patient may resume diet as tolerated: Wean off of narcotic pain medication to plain tylenol or ibuprofen if able to take. Please take all of antibiotic if prescribed. Patient should take prescriptions as directed  No driving while on narcotics  Patient ok to shower in 24 hrs after discharge while keeping incision clean / dry  No submerging incisions for 2 weeks  No sexual intercourse for 2 weeks    Please call attending physician or hospital  with questions  Call or Present to ED if fever (> 101F), intractable nausea/vomiting or pain, if incisions become red/swollen or drain pus/fluid, or if calves become red swollen and tender. Patient should follow up with  ***, in *** weeks. Call office to confirm appointment.

## 2023-04-14 NOTE — PERIOP NOTE
Patient nauseous at discharge, crackers given and patient reported pain and nausea at acceptable level. Discharge instructions reviewed by patient and family at bedside. No questions at this time.

## 2023-06-07 ENCOUNTER — TELEPHONE (OUTPATIENT)
Dept: UROLOGY | Age: 53
End: 2023-06-07

## 2023-06-07 NOTE — TELEPHONE ENCOUNTER
Patient called in and stated \"No one told me that my appointment was moved to 10:00A d/t My Peña being out of the office.  I can only do afternoon appts, preferably the latest. Patient is scheduled for 06/12/2023 @300p

## 2023-09-08 ENCOUNTER — TELEPHONE (OUTPATIENT)
Dept: UROLOGY | Age: 53
End: 2023-09-08

## 2023-09-19 ENCOUNTER — HOSPITAL ENCOUNTER (OUTPATIENT)
Age: 53
Setting detail: SPECIMEN
Discharge: HOME | End: 2023-09-19

## 2023-09-19 DIAGNOSIS — E78.2 MIXED HYPERLIPIDEMIA: ICD-10-CM

## 2023-09-19 DIAGNOSIS — R53.83 LOW ENERGY: ICD-10-CM

## 2023-09-19 DIAGNOSIS — R79.89 LOW TSH LEVEL: ICD-10-CM

## 2023-09-19 DIAGNOSIS — Z12.5 PROSTATE CANCER SCREENING: ICD-10-CM

## 2023-09-19 DIAGNOSIS — R73.9 ELEVATED BLOOD SUGAR: ICD-10-CM

## 2023-09-19 LAB
ALBUMIN SERPL-MCNC: 4.1 G/DL (ref 3.5–5.2)
ALBUMIN/GLOB SERPL: 1.2 {RATIO} (ref 1–2.5)
ALP SERPL-CCNC: 77 U/L (ref 40–129)
ALT SERPL-CCNC: 52 U/L (ref 5–41)
ANION GAP SERPL CALCULATED.3IONS-SCNC: 11 MMOL/L (ref 9–17)
AST SERPL-CCNC: 40 U/L
BILIRUB SERPL-MCNC: 0.5 MG/DL (ref 0.3–1.2)
BUN SERPL-MCNC: 14 MG/DL (ref 6–20)
CALCIUM SERPL-MCNC: 9.1 MG/DL (ref 8.6–10.4)
CHLORIDE SERPL-SCNC: 103 MMOL/L (ref 98–107)
CHOLEST SERPL-MCNC: 157 MG/DL
CHOLESTEROL/HDL RATIO: 3.6
CO2 SERPL-SCNC: 24 MMOL/L (ref 20–31)
CREAT SERPL-MCNC: 0.9 MG/DL (ref 0.7–1.2)
ERYTHROCYTE [DISTWIDTH] IN BLOOD BY AUTOMATED COUNT: 12.9 % (ref 11.8–14.4)
GFR SERPL CREATININE-BSD FRML MDRD: >60 ML/MIN/1.73M2
GLUCOSE SERPL-MCNC: 90 MG/DL (ref 70–99)
HCT VFR BLD AUTO: 46.3 % (ref 40.7–50.3)
HDLC SERPL-MCNC: 44 MG/DL
HGB BLD-MCNC: 15.4 G/DL (ref 13–17)
LDLC SERPL CALC-MCNC: 95 MG/DL (ref 0–130)
MCH RBC QN AUTO: 30 PG (ref 25.2–33.5)
MCHC RBC AUTO-ENTMCNC: 33.3 G/DL (ref 28.4–34.8)
MCV RBC AUTO: 90.1 FL (ref 82.6–102.9)
NRBC BLD-RTO: 0 PER 100 WBC
PLATELET # BLD AUTO: 245 K/UL (ref 138–453)
PMV BLD AUTO: 10.8 FL (ref 8.1–13.5)
POTASSIUM SERPL-SCNC: 4.8 MMOL/L (ref 3.7–5.3)
PROT SERPL-MCNC: 7.4 G/DL (ref 6.4–8.3)
PSA SERPL-MCNC: 1.01 NG/ML
RBC # BLD AUTO: 5.14 M/UL (ref 4.21–5.77)
SODIUM SERPL-SCNC: 138 MMOL/L (ref 135–144)
T4 FREE SERPL-MCNC: 1.2 NG/DL (ref 0.9–1.7)
TESTOST SERPL-MCNC: 463 NG/DL (ref 220–1000)
TRIGL SERPL-MCNC: 88 MG/DL
TSH SERPL DL<=0.05 MIU/L-ACNC: 0.98 UIU/ML (ref 0.3–5)
WBC OTHER # BLD: 6.2 K/UL (ref 3.5–11.3)

## 2023-09-20 LAB
EST. AVERAGE GLUCOSE BLD GHB EST-MCNC: 120 MG/DL
HBA1C MFR BLD: 5.8 % (ref 4–6)

## 2023-10-09 ENCOUNTER — OFFICE VISIT (OUTPATIENT)
Dept: UROLOGY | Age: 53
End: 2023-10-09
Payer: COMMERCIAL

## 2023-10-09 DIAGNOSIS — N43.3 HYDROCELE, UNSPECIFIED HYDROCELE TYPE: Primary | ICD-10-CM

## 2023-10-09 PROCEDURE — 99212 OFFICE O/P EST SF 10 MIN: CPT | Performed by: UROLOGY

## 2023-10-09 ASSESSMENT — ENCOUNTER SYMPTOMS
NAUSEA: 0
BACK PAIN: 0
EYE PAIN: 0
GASTROINTESTINAL NEGATIVE: 1
WHEEZING: 0
COUGH: 0
ABDOMINAL PAIN: 0
DIARRHEA: 0
EYES NEGATIVE: 1
RESPIRATORY NEGATIVE: 1
EYE REDNESS: 0
CONSTIPATION: 0
SHORTNESS OF BREATH: 0
VOMITING: 0

## 2023-10-09 NOTE — PROGRESS NOTES
[address] [place] [address]  Dept: [address] [place] [address] Urology Office Note - Established    Patient:  Katty Tran  YOB: 1970  Date: 10/9/2023    The patient is a 52 y.o. male who presents todayfor evaluation of the following problems:   Chief Complaint   Patient presents with    Hydrocele     3 month f/u       HPI  Sp right hydrocelctomy  doing great    Summary of old records: N/A    Additional History: N/A    Procedures Today: N/A    Urinalysis today:  No results found for this visit on 10/09/23. Last several PSA's:  Lab Results   Component Value Date    PSA 1.01 09/19/2023    PSA 1.11 01/05/2022    PSA 1.94 02/24/2020     Last total testosterone:  Lab Results   Component Value Date    TESTOSTERONE 463 09/19/2023       AUA Symptom Score (10/9/2023):                                Last BUN and creatinine:  Lab Results   Component Value Date    BUN 14 09/19/2023     Lab Results   Component Value Date    CREATININE 0.9 09/19/2023       Additional Lab/Culture results: none    Imaging Reviewed during this Office Visit: none  (results were independently reviewed by physician and radiology report verified)    PAST MEDICAL, FAMILY AND SOCIAL HISTORY UPDATE:  Past Medical History:   Diagnosis Date    Bradycardia     patient is a runner    Colon polyp     GERD (gastroesophageal reflux disease)     Hiatal hernia     Hydrocele 03/2023    right    Hyperlipidemia     Under care of service provider 03/23/2023    pcp-marty saleh-last visit march 2023     Past Surgical History:   Procedure Laterality Date    COLONOSCOPY      ENDOSCOPY, COLON, DIAGNOSTIC      HYDROCELE EXCISION Right 4/14/2023    HYDROCELECTOMY performed by Cameron Richardson MD at [facility] Right 04/14/2023    HYDROCELECTOMY     Family History   Problem Relation Age of Onset    No Known Problems Mother     No

## 2023-10-10 VITALS
HEART RATE: 50 BPM | SYSTOLIC BLOOD PRESSURE: 134 MMHG | WEIGHT: 191 LBS | TEMPERATURE: 97 F | HEIGHT: 69 IN | DIASTOLIC BLOOD PRESSURE: 88 MMHG | BODY MASS INDEX: 28.29 KG/M2

## 2023-11-27 NOTE — PROGRESS NOTES
Review of Systems   Constitutional: Negative. Negative for appetite change, chills and fatigue. Eyes: Negative. Negative for pain, redness and visual disturbance. Respiratory: Negative. Negative for cough, shortness of breath and wheezing. Cardiovascular: Negative. Negative for chest pain and leg swelling. Gastrointestinal: Negative. Negative for abdominal pain, constipation, diarrhea, nausea and vomiting. Genitourinary: Negative. Negative for difficulty urinating, dysuria, flank pain, frequency, hematuria and urgency. Musculoskeletal: Negative. Negative for back pain, joint swelling and myalgias. Skin: Negative. Negative for rash and wound. Neurological: Negative. Negative for dizziness, weakness and numbness. Hematological: Negative. Does not bruise/bleed easily. dx primary hyperparathyroidism

## 2024-03-18 ENCOUNTER — HOSPITAL ENCOUNTER (OUTPATIENT)
Dept: PHYSICAL THERAPY | Facility: CLINIC | Age: 54
Setting detail: THERAPIES SERIES
Discharge: HOME | End: 2024-03-18
Payer: COMMERCIAL

## 2024-03-18 PROCEDURE — 97161 PT EVAL LOW COMPLEX 20 MIN: CPT

## 2024-03-18 NOTE — CONSULTS
Home exercise program  Method of Education: [x] Verbal - deep breathing technique to avoid tensing of muscles [] Demo  [] Written  Comprehension of Education:  [x] Verbalizes understanding.  [x] Demonstrates understanding.  [] Needs Review.  [] Demonstrates/verbalizes understanding of HEP/Ed previously given.        Treatment Plan:  [x] Therapeutic Exercise   13581  [] Iontophoresis: 4 mg/mL Dexamethasone Sodium Phosphate  mAmin  83051   [x] Therapeutic Activity  71527 [x] Vasopneumatic cold with compression  60958    [] Gait Training   15316 [] Ultrasound   55916   [x] Neuromuscular Re-education  23506 [] Electrical Stimulation Unattended  02826   [x] Manual Therapy  04375 [] Electrical Stimulation Attended  19711   [x] Instruction in HEP  [] Lumbar/Cervical Traction  48255   [x] Aquatic Therapy   33662 [x] Cold/hotpack    [] Massage   20569      [] Dry Needling, 1 or 2 muscles  78309   [] Biofeedback, first 15 minutes   90912  [] Biofeedback, additional 15 minutes   90913 [] Dry Needling, 3 or more muscles  01132         Frequency:  2 x/week for 12 visits        Today’s Treatment:  Modalities:   Precautions:  Exercises:  Exercise Reps/ Time Weight/ Level Comments                                 Other:    Specific Instructions for next treatment:   - initiate aquatic therapy  - Gentle strengthening of core stabilizers and BLE   - Global stretching to lumbar paraspinals and B hip girdles  - Manual (STM, MFR) per tolerance  - CP/HP prn      Evaluation Complexity:  History (Personal factors, comorbidities) [] 0 [] 1-2 [x] 3+   Exam (limitations, restrictions) [] 1-2 [] 3 [x] 4+   Clinical presentation (progression) [x] Stable [] Evolving  [] Unstable   Decision Making [x] Low [] Moderate [] High    [x] Low Complexity [] Moderate Complexity [] High Complexity       Treatment Charges: Mins Units   [x] Evaluation       [x]  Low       []  Moderate       []  High 40 1   []  Modalities     []  Ther Exercise     []

## 2024-03-19 NOTE — FLOWSHEET NOTE
[] [facility]  Outpatient Rehabilitation &  Therapy  [address]  [phone]  [phone] [] [facility]  Outpatient Rehabilitation &  Therapy  [address]  P: [phone]  F: [phone] [x] [facility]  Outpatient Rehabilitation &  Therapy  [address]  P: [phone]  F: [phone] [] [facility]  Outpatient Rehabilitation &  Therapy  [address]  [phone]  [phone] [] [facility]  Outpatient Rehabilitation &  Therapy  [address]   P: [phone]  F: [phone]  [] [facility]  Outpatient Rehabilitation &  Therapy  [address]  P: [phone]  F: [phone] [] [facility]  Outpatient Rehabilitation &  Therapy  [address]  P: [phone]  F: [phone] [] [facility]  Outpatient Rehabilitation &  Therapy  [address]  P: [phone]  F: [phone] [] [place]  Outpatient Rehabilitation &  Therapy  [address]  P: [phone]  F: [phone]  [] [facility] Outpatient Rehabilitation &  Therapy  [address]  P: [phone]  F: [phone]     THERAPY RESPONSIBILITY OF CARE TRANSFER FORM       PATIENT NAME: Reynold Mojica  MRN: [id number]   : 1970      TRANSFERRING FACILITY:    [x] [place]   [] [facility]   [] [facility]   [] [facility]   [] [facility] [] [place]   [] [facility]  [] [facility]  [] [facility]   [] [place]  [] [place]   [] Other:          ACCEPTING FACILITY  [] [facility]   [x] [facility]   [] [facility]   [] [facility]   [] [facility] [] [place]   [] [facility]  [] [facility]  [] [facility]   [] [place]  [] [place]   [] Other:         REASON FOR TRANSFER: Pt requested to do therapy at a

## 2024-03-25 ENCOUNTER — HOSPITAL ENCOUNTER (OUTPATIENT)
Dept: PHYSICAL THERAPY | Age: 54
Setting detail: THERAPIES SERIES
Discharge: HOME | End: 2024-03-25
Payer: COMMERCIAL

## 2024-03-25 PROCEDURE — 97113 AQUATIC THERAPY/EXERCISES: CPT

## 2024-03-25 PROCEDURE — 97110 THERAPEUTIC EXERCISES: CPT

## 2024-03-25 NOTE — PROGRESS NOTES
Plan: [x] Continue per plan of care.   [x] Other:      Treatment Charges: Mins Time in/out Units   []  Modalities      [x]  Ther Exercise 20 900/0920 1   []  Manual Therapy      []  Ther Activities      []  Aquatics      []  Neuromuscular      [] Vasocompression      [] Gait Training      [] Dry needling        [] 1 or 2 muscles        [] 3 or more muscles      []  Other      Total Billable time 20 0900/0920 1   Test and measures billed under therex  Physical therapy treatment completed today in part by physical therapist assistant (PTA).  Treatment times reflect total treatment time combined by both PT and PTA for today's service.    Progress Time In:9:00am            Time Out: 9:20am    Electronically signed by:  Jace Rosales PT

## 2024-03-25 NOTE — PRE-CERTIFICATION NOTE
[x] [facility]   Outpatient Rehabilitation & Therapy  [address]  P: [phone]   F: [phone]     Physical Therapy Insurance Communication Note    Date: 3/25/2024  Patient: Reynold Mojica  : 1970  MRN: [id number]      Visit Count:   Cancels/No Shows to date:     Left message for Nena in HR @ "[employer] [place] regarding injured workers claim- requesting any information in writing regarding authorization for this claim, if she needs notes sent to her, if they are self insured etc. I explained the patient was initially scheduled with his commercial BCBS and we want to verify we are authorized and how often he can be seen. Requested a call back or fax with any info.     Pre notes from Phyllis Santoro referral note billing is to be remitted to :      -Third Party Liability   [employer] [place]  [address] [place] [address]    Contact Info : Latricia Ly in [phone]      3/26/24     Called Latricia Ly @ [employer] back regarding Mr. Mojica claims- I requested something formal in writing that they are allowing this claim, her contact info and billing information as we have nothing on file . I explained to Latricia the patient was registered under his medical insurance prior to evaluation. She took down our address and fax number stating she would work on getting something over to us.       Electronically signed by: Marly Fraire, PTA

## 2024-03-25 NOTE — FLOWSHEET NOTE
SLS                Stretches        Achllies        Hamstring 2x20\"               Cool Down 2 Laps       Pain Rating 6            Specific Instructions for next treatment: Assess tolerance to initial aquatic therapy visit and progress as patient is able.       Assessment: [x] Progressing toward goals. Initiated aquatic therapy for lumbar back pain.  Educated on postural awareness, core stability and working in pain free ranges with all exercises.  Good technique with exercises but cueing to count reps to avoid overdoing it.  Reports mild relief in lumbar back while in warm water aquatic therapy setting.  Performed UE format exercises in shoulder deep water for upper back and shoulder stabilization and strengthening.   Good tolerance to exercises with no  increased pain noted after therapy.     [] No change.     [] Other:    [x] Patient would continue to benefit from skilled physical therapy services in order to: manage pain and tissue inflammation, improve core stabilizers and BLE strength globally, improve tissue extensibility of lumbosacral musculatures and BLE throughout to assist pt in improving tolerance with daily physical activities and overall quality of life.      STG: (to be met in 7 treatments)  Pt will reduce pain to less than or equal to 4/10 with ADLs  Pt to improve lumbar ROM with report of reduced pain to 4/10 max with active movements to demo improved tissue extensibility   Flexion <35% limited  Extension: <50% limited  Rotation <35% limited  Sidebend <25% limited  Pt to improve B hip strength to at least 3+/5 globally with report of reduced pain to 4/10 max with MMT to demo improved core function and stability  Pt to report reduced pain to 4/10 max with palpation to demo improved tissue inflammation and healing  Patient to be independent with home exercise program as demonstrated by performance with correct form without cues     LTG: (to be met in 12 treatments)  Pt will improve Oswestry

## 2024-03-28 ENCOUNTER — APPOINTMENT (OUTPATIENT)
Dept: PHYSICAL THERAPY | Age: 54
End: 2024-03-28
Payer: COMMERCIAL

## 2024-03-29 ENCOUNTER — HOSPITAL ENCOUNTER (OUTPATIENT)
Dept: PHYSICAL THERAPY | Age: 54
Setting detail: THERAPIES SERIES
Discharge: HOME | End: 2024-03-29
Payer: COMMERCIAL

## 2024-03-29 NOTE — FLOWSHEET NOTE
[x] [facility]   Outpatient Rehabilitation & Therapy  [address]  P: [phone]   F: [phone]     Physical Therapy Cancel/No Show note    Date: 3/29/2024  Patient: Reynold Mojica  : 1970  MRN: [id number]    Visit Count:   Cancels/No Shows to date:     For today's appointment patient:    [x]  Cancelled by therapist    [] Rescheduled appointment    [] No-show     Reason given by patient:    []  Patient ill    []  Conflicting appointment    [] No transportation      [] Conflict with work    [] No reason given    [] Weather related    [] COVID-19    [x] Other:      Comments:  contacted patient and discussed with patient missing appointment.  Scheduling error by therapist due to patient telling writer he was unable to make it due to other appointment at this time.  Confirmed 24 @ 3 pm       [] Next appointment was confirmed    Electronically signed by: Og Roman, PTA

## 2024-04-01 ENCOUNTER — HOSPITAL ENCOUNTER (OUTPATIENT)
Dept: PHYSICAL THERAPY | Age: 54
Setting detail: THERAPIES SERIES
Discharge: HOME | End: 2024-04-01
Payer: COMMERCIAL

## 2024-04-01 PROCEDURE — 97113 AQUATIC THERAPY/EXERCISES: CPT

## 2024-04-01 NOTE — PRE-CERTIFICATION NOTE
[x] [facility]   Outpatient Rehabilitation & Therapy  [address]  P: [phone]   F: [phone]     Physical Therapy Insurance Communication Note    Date: 2024  Patient: Reyonld Mojica  : 1970  MRN: [id number]      Visit Count: 3/12  Cancels/No Shows to date:     Left message for Nena in HR @ [employer] regarding injured workers claim- requesting any information in writing regarding authorization for this claim, if she needs notes sent to her, if they are self insured etc. I explained the patient was initially scheduled with his commercial BCBS and we want to verify we are authorized and how often he can be seen. Requested a call back or fax with any info.     Pre notes from Phyllis Santoro referral note billing is to be remitted to :      -Third Party Liability   [employer]  [address] [place] [address]    Contact Info : Latricia Ly in [phone]      3/26/24     Called Latricia Ly @ [facility] back regarding Mr. Mojica claims- I requested something formal in writing that they are allowing this claim, her contact info and billing information as we have nothing on file . I explained to Latricia the patient was registered under his medical insurance prior to evaluation. She took down our address and fax number stating she would work on getting something over to us.       24 Spoke with patient today after pool therapy session today. We still have not received any information from [employer]. He reports going to see physician and  tomorrow and I asked patient to request documentation for workers compensation.       Electronically signed by: Jace Rosales, PT

## 2024-04-01 NOTE — FLOWSHEET NOTE
[x] [facility]   Outpatient Rehabilitation & Therapy  [address]  P: [phone]   F: [phone]    Physical Therapy Daily Treatment Note    Date:  2024  Patient Name:  Reynold Mojica    :  1970  MRN: [id number]  Physician: Geronimo Gutiérrez MD                            Insurance: [facility] ( remains, auth after , $35 copay)   Medical Diagnosis: M54.50 (ICD-10-CM) - Acute bilateral low back pain without sciatica   Rehab Codes: M54.59, M25.651. M25.652, R29.3, M62.81  Onset Date: 3/4/24                 Next 's appt.: TBD  Visit# / total visits: 3/12  Cancels/No Shows: 0/0    Subjective:    Patient reporting no changes in symptoms since last session. Patient questioning the POC and efficacy of the exercises he is doing. Patient also states his job is not following his restrictions given by his doctor, so he is still working ~50hrs a week and lifting over 50#.   Pain:  [x] Yes  [] No Location: Across lumbar back with no radiculopathy.    Pain Rating: (0-10 scale) 6-7/10 across low back with no radicular symptoms.   Pain altered Tx:  [x] No  [] Yes  Action:  Comments:      Objective:    [facility] Exercise Log  Aquatic, Hip & DLS Program- Phase 1    Date of Eval: 3/18/24                               Primary PT: David Rosales PT      Date 3/25/24 4/1/24      Visit # 2/12 3/12      Walk F/L/R 2 Laps 2 Laps      Marching 10x 1 Lap      Squats 10x3\" 10x3''      Step-Ups F/L        Step Down F/L        Heel-toe raises 10x 10x      SLR F/L/R 10x 10x      Hip/Knee Flex/Ext        F/L Lunges                Kickboard Ex.  Small      Iso Abd.  10x5''      Push-pull  10x      Paddling                UE Format:        Horiz Abd/Add 10x 10x      IR/ER (wipers) 10x 10x      Alt Flex/Ext 10x 10x      Alt Press Down        Abd/Add 10x 10x              Deep Water:  1 Noodle      Hang  5'      Cycling        Jacks        X-Country                Balance

## 2024-04-03 ENCOUNTER — HOSPITAL ENCOUNTER (OUTPATIENT)
Dept: PHYSICAL THERAPY | Age: 54
Setting detail: THERAPIES SERIES
Discharge: HOME | End: 2024-04-03
Payer: COMMERCIAL

## 2024-04-03 NOTE — PRE-CERTIFICATION NOTE
[x] [facility]   Outpatient Rehabilitation & Therapy  [address]  P: [phone]   F: [phone]     Physical Therapy Insurance Communication Note    Date: 4/3/2024  Patient: Reynold Mojica  : 1970  MRN: [id number]      Visit Count: 3/12  Cancels/No Shows to date:       Employer: [employer]    Contacted Latricia Ly in HR @[phone] due to not receiving anything from employer regarding work injury/claim allowance. Left message requested info noted below on call 3/26/24.     Contacted patient to follow up on claim: patient states he had to return back to urgent care after seeing Dr No because his PCP does not handle Worker's Comp injuries. I explained to the patient the referral he is here through is through PCP and not his worker comp doctor at [facility]. Contacted [facility] which is where patient is receiving care related to his work injury for his back- and they report PT had not been ordered to date for this injury.     Patient states he has an appt with the Urgent care Tomorrow and HR at his work regarding his claim. I explained we need information regarding this workers comp claim and approval of PT to continue. Patient reports he will be sure to get things in writing.      [facility] reports the contact they have for [employer] is Efe Morales [phone]      3/26/24     Called Latricia Ly @ [employer] back regarding Mr. Mojica claims- I requested something formal in writing that they are allowing this claim, her contact info and billing information as we have nothing on file. I explained to Latricia the patient was registered under his medical insurance prior to evaluation. She took down our address and fax number stating she would work on getting something over to us.         Electronically signed by: Marly Fraire PTA

## 2024-04-03 NOTE — FLOWSHEET NOTE
[x] [facility]   Outpatient Rehabilitation & Therapy  [address]  P: [phone]   F: [phone]     Physical Therapy Cancel/No Show note    Date: 4/3/2024  Patient: Reynold Mojica  : 1970  MRN: [id number]    Visit Count: 3/12  Cancels/No Shows to date:     For today's appointment patient:    [x]  Cancelled by therapist    [] Rescheduled appointment    [] No-show     Reason given by patient:    []  Patient ill    []  Conflicting appointment    [] No transportation      [] Conflict with work    [x] No reason given    [] Weather related    [] COVID-19    [] Other:      Comments:        [x] Next appointment was confirmed    Electronically signed by: Marly Fraire, PTA

## 2024-04-08 ENCOUNTER — HOSPITAL ENCOUNTER (OUTPATIENT)
Dept: PHYSICAL THERAPY | Age: 54
Setting detail: THERAPIES SERIES
Discharge: HOME | End: 2024-04-08
Payer: COMMERCIAL

## 2024-04-08 PROCEDURE — 97113 AQUATIC THERAPY/EXERCISES: CPT

## 2024-04-08 NOTE — PRE-CERTIFICATION NOTE
[x] [facility]   Outpatient Rehabilitation & Therapy  [address]  P: [phone]   F: [phone]     Physical Therapy Insurance Communication Note    Date: 2024  Patient: Reynold Mojica  : 1970  MRN: [id number]      Visit Count: 3/12  Cancels/No Shows to date:       Employer: [employer]    Left message or Efe Morales requesting information about patients stated claim and whether to bill employer or private insurance as we still have no documentation certifying this claim or coverage despite multiple phone calls/attempts to reach the employer.       Spoke to patient regarding worker's comp claim. Patient arrives with Order for Low back Strain from [facility] to continue PT for his back injury. Patient reports his employer is aware of all of his therapy dates. He recommended if I do not hear from Efe Morales or Latricia to contact the  Kenneth @ [phone].     Electronically signed by: Marly Fraire, PTA

## 2024-04-08 NOTE — FLOWSHEET NOTE
back but denies increased pain. Discussed transitioning to land based exercise for HEP/stretching when patient's pain is more manageable. He defers at this time and would like to continue with aquatics.     [] No change.     [] Other:    [x] Patient would continue to benefit from skilled physical therapy services in order to: manage pain and tissue inflammation, improve core stabilizers and BLE strength globally, improve tissue extensibility of lumbosacral musculatures and BLE throughout to assist pt in improving tolerance with daily physical activities and overall quality of life.      STG: (to be met in 7 treatments)  Pt will reduce pain to less than or equal to 4/10 with ADLs  Pt to improve lumbar ROM with report of reduced pain to 4/10 max with active movements to demo improved tissue extensibility   Flexion <35% limited  Extension: <50% limited  Rotation <35% limited  Sidebend <25% limited  Pt to improve B hip strength to at least 3+/5 globally with report of reduced pain to 4/10 max with MMT to demo improved core function and stability  Pt to report reduced pain to 4/10 max with palpation to demo improved tissue inflammation and healing  Patient to be independent with home exercise program as demonstrated by performance with correct form without cues     LTG: (to be met in 12 treatments)  Pt will improve Oswestry score from 22% impaired to less than 10% impaired to demo improved functional mobility to participate in daily functional activities  Pt to improve lumbar ROM to WFL throughout to demo improved tissue extensibility   Pt to improve B hip strength to at least 4/5 globally to participate in work-related activities     Pt. Education:  [] Yes  [] No  [x] Reviewed Prior HEP/Ed- Core activation; benefits of aquatics; avoidance of increasing pain  Method of Education: [x] Verbal  [x] Demo  [x] Written Aquatic HEP issued of U/LE exercise and warm up laps listed above (denoted by * )  Comprehension of

## 2024-04-09 NOTE — PRE-CERTIFICATION NOTE
[x] [facility]   Outpatient Rehabilitation & Therapy  [address]  P: [phone]   F: [phone]     Physical Therapy Insurance Communication Note    Date: 2024  Patient: Reynold Mojica  : 1970  MRN: [id number]      Visit Count:   Cancels/No Shows to date:       Employer: [employer]    -Third Party Liability   [employer]  [address] [place] [address]     Contact Info : Latricia Ly in [phone]  OR Efe Morales   Kenneth [phone]    Left another message for Latricia Ly requesting information about patients stated claim and whether to bill employer or private insurance as we still have no documentation certifying this claim or coverage despite multiple phone calls/attempts to reach the employer.      Patient did arrive with referral from his [facility] provider, [facility] for physical therapy 24. Patient reports his is turning in all of his therapy appt times and documentation from his doctor to his work so they are aware of his attending.       Electronically signed by: Marly Fraire, PTA

## 2024-04-11 ENCOUNTER — HOSPITAL ENCOUNTER (OUTPATIENT)
Dept: PHYSICAL THERAPY | Age: 54
Setting detail: THERAPIES SERIES
Discharge: HOME | End: 2024-04-11
Payer: COMMERCIAL

## 2024-04-11 PROCEDURE — 97113 AQUATIC THERAPY/EXERCISES: CPT

## 2024-04-11 NOTE — FLOWSHEET NOTE
[x] [facility]   Outpatient Rehabilitation & Therapy  [address]  P: [phone]   F: [phone]    Physical Therapy Daily Treatment Note    Date:  2024  Patient Name:  Reynold Mojica    :  1970  MRN: [id number]  Physician: Geronimo Gutiérrez MD (Addend 24, [facility] providers: Michelle Cintron MD, Sophie Reed MD)                            Insurance: Self Insured Work Related Injury: Employer: [employer]   Contact:  -Third Party Liability   [employer]ashaun Jettc  [address] [place] [address]  Latricia Ly in [phone]  Medical Diagnosis: M54.50 (ICD-10-CM) - Acute bilateral low back pain without sciatica (Addend 24: S39.012A Low back strain)  Rehab Codes: M54.59, M25.651. M25.652, R29.3, M62.81  Onset Date: 3/4/24                 Next 's appt.: To see Workers Comp doctor @ [facility] every Monday  Visit# / total visits:   Cancels/No Shows:     Subjective:    Patient denies increased pain after last visit- states symptoms remain about the same. Notes primarily tightness in left lower back. States it was very tender to touch when his chiropractor pressed there. Patient is requesting therapist examine this area and possibly provide written HEP.  Patient also reports he will be unavailable for appt 4/15 (no reason given) and all appointments  going forward because he will be out of town for 10 days. Patient requests to be see  and check in with the therapist.       Pain:  [x] Yes  [] No Location: Lower back L.R- denies radicular symptoms.    Pain Rating: (0-10 scale) 6/10   Pain altered Tx:  [x] No  [] Yes  Action:    Comments:      Objective:    [facility] Services Exercise Log  Aquatic, Hip & DLS Program- Phase 1    Date of Eval: 3/18/24                               Primary PT: David Rosales, PT      Date 3/24    Visit # 2/12 3/12 4/12 5/12    Walk F/L/R 2 Laps 2 Laps

## 2024-04-11 NOTE — PRE-CERTIFICATION NOTE
[x] [facility]   Outpatient Rehabilitation & Therapy  [address]  P: [phone]   F: [phone]     Physical Therapy Insurance Communication Note    Date: 2024  Patient: Reynold Mojica  : 1970  MRN: [id number]      Visit Count:   Cancels/No Shows to date:       Employer: [employer]    -Third Party Liability   [employer]ashaun Jettc  [address] [place] [address]     Contact Info : Latricia Olmedoh in [phone]  OR Efe Dexterer   Kenneth [phone]    Sent letter with patient to deliver to work requesting information regarding his claim. He reports Latricia is off this week. Copy of letter in patient chart      Electronically signed by: Marly Fraire, PTA

## 2024-04-15 ENCOUNTER — APPOINTMENT (OUTPATIENT)
Dept: PHYSICAL THERAPY | Age: 54
End: 2024-04-15
Payer: COMMERCIAL

## 2024-04-17 ENCOUNTER — HOSPITAL ENCOUNTER (OUTPATIENT)
Dept: PHYSICAL THERAPY | Age: 54
Setting detail: THERAPIES SERIES
Discharge: HOME | End: 2024-04-17
Payer: COMMERCIAL

## 2024-04-17 NOTE — FLOWSHEET NOTE
[x] [facility]   Outpatient Rehabilitation & Therapy  [address]  P: [phone]   F: [phone]     Physical Therapy Cancel/No Show note    Date: 2024  Patient: Reynold Mojica  : 1970  MRN: [id number]    Visit Count:   Cancels/No Shows to date:     For today's appointment patient:    [x]  Cancelled    [] Rescheduled appointment    [] No-show     Reason given by patient:    []  Patient ill    []  Conflicting appointment    [] No transportation      [] Conflict with work    [] No reason given    [] Weather related    [] COVID-19    [x] Other:      Comments:  Patient called and requested to speak with PT asking if he had received anything from employer in which we have not even though patient reports that he had given HR the letter from us requesting information regarding his claim. 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   Patient reports he will call back to schedule, he returns on 24.      [] Next appointment was confirmed    Electronically signed by: Jace Rosales PT

## 2024-04-18 ENCOUNTER — APPOINTMENT (OUTPATIENT)
Dept: PHYSICAL THERAPY | Age: 54
End: 2024-04-18
Payer: COMMERCIAL

## 2024-04-22 ENCOUNTER — APPOINTMENT (OUTPATIENT)
Dept: PHYSICAL THERAPY | Age: 54
End: 2024-04-22
Payer: COMMERCIAL

## 2024-04-25 ENCOUNTER — APPOINTMENT (OUTPATIENT)
Dept: PHYSICAL THERAPY | Age: 54
End: 2024-04-25
Payer: COMMERCIAL

## 2024-05-03 ENCOUNTER — HOSPITAL ENCOUNTER (OUTPATIENT)
Dept: PHYSICAL THERAPY | Age: 54
Setting detail: THERAPIES SERIES
Discharge: HOME | End: 2024-05-03
Payer: COMMERCIAL

## 2024-05-03 PROCEDURE — 97110 THERAPEUTIC EXERCISES: CPT

## 2024-05-03 NOTE — PROGRESS NOTES
[x] [facility]   Outpatient Rehabilitation & Therapy  [address]  P: [phone]   F: [phone]    Physical Therapy Progress Note    Date: 5/3/2024      Patient: Reynold Mojica  : 1970  MRN: [id number]    Physician: Geronimo Gutiérrez MD (Addend 24, [facility] providers: Michelle Cintron MD, Sophie Reed MD)                            Insurance: Self Insured Work Related Injury: Employer: [employer]   Contact:  -Third Party Liability   Rashaun Durant  [address] [place] [address]  Latricia Ly in [phone]  Medical Diagnosis: M54.50 (ICD-10-CM) - Acute bilateral low back pain without sciatica (Addend 24: S39.012A Low back strain)  Rehab Codes: M54.59, M25.651. M25.652, R29.3, M62.81  Onset Date: 3/4/24                 Next 's appt.: To see Workers Comp doctor @ [facility] every Monday  Visit# / total visits:                     Cancels/No Shows:   Date of initial visit: 3/18/24                   Subjective:  Patient reports that as the day goes on he feels that it is slightly improving with aquatics but he reports pain is located on the left side of lumbar spine with continual pinpoint tenderness He saw physician on Monday with new script noted with restrictions. 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 He reports he is has not heard anything from  as well.      Pain:  [x] Yes  [] No  Location: Low back N/A Pain Rating: (0-10 scale) 7/10  Pain altered Tx:  [x] No  [] Yes  Action:  Comments:    Objective:           STRENGTH     Left Right   L1-2 Hip Flex 3/5* 3+/5*   Hip Abd 3/5* 3/5*   Hip Ext 3/5* 3/5*   Knee Flex       L3-4 Knee Ext       L5 EHL       L4 Ankle DF       S1 Plant. Flex       Abdominals poor poor   Erector Spinae poor poor           *painful            ROM   Lumbar *painful with all movements   Flexion 25% impaired   Extension 25%

## 2024-05-06 ENCOUNTER — HOSPITAL ENCOUNTER (OUTPATIENT)
Dept: PHYSICAL THERAPY | Age: 54
Setting detail: THERAPIES SERIES
Discharge: HOME | End: 2024-05-06
Payer: COMMERCIAL

## 2024-05-06 PROCEDURE — 97113 AQUATIC THERAPY/EXERCISES: CPT

## 2024-05-06 NOTE — FLOWSHEET NOTE
[x] [facility]   Outpatient Rehabilitation & Therapy  [address]  P: [phone]   F: [phone]    Physical Therapy Daily Treatment Note    Date:  2024  Patient Name:  Reynold Mojica    :  1970  MRN: [id number]  Physician: Geronimo Gutiérrez MD (Addend 24, [facility] providers: Michelle Cintron MD, Sophie Reed MD)                            Insurance: Self Insured Work Related Injury: Employer: [employer]   Contact:  -Third Party Liability   Rashaun Jettc  [address] [place] [address]  Latricia Ly in [phone]  Medical Diagnosis: M54.50 (ICD-10-CM) - Acute bilateral low back pain without sciatica (Addend 24: S39.012A Low back strain)  Rehab Codes: M54.59, M25.651. 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 Does not feel work is accommodating his restrictions. States he saw doc  and no change in restrictions- reports doctor offered looking into injections and MRI if no change in symptoms. Patient reports he would like to combine land therapy with aquatics to trial other modalities for pain relief.     Reports minimal communication with HR at work regarding his injury coverage.     Pain:  [x] Yes  [] No Location: left lower back- QL region- just above PSIS; denies radicular symptoms   Pain Rating: (0-10 scale) 7/10   Pain altered Tx:  [x] No  [] Yes  Action:    Comments:      Objective:    [facility]   Rehabilitation Services Exercise Log  Aquatic, Hip & DLS Program- Phase 1    Date of Eval: 3/18/24                               Primary PT: David Rosales, PT      Date 24    Visit # 3/12 4/12 5/12 7/12    Walk F/L/R 2 Laps 2 Laps +R * 2 Laps 2 Laps    Marching 1 Lap 2 Laps * 2 Laps 2 Laps

## 2024-05-10 ENCOUNTER — HOSPITAL ENCOUNTER (OUTPATIENT)
Dept: PHYSICAL THERAPY | Age: 54
Setting detail: THERAPIES SERIES
Discharge: HOME | End: 2024-05-10
Payer: COMMERCIAL

## 2024-05-10 NOTE — FLOWSHEET NOTE
[x] [facility]   Outpatient Rehabilitation & Therapy  [address]  P: [phone]   F: [phone]     Physical Therapy Cancel/No Show note    Date: 5/10/2024  Patient: Reynold Mojica  : 1970  MRN: [id number]    Visit Count:   Cancels/No Shows to date:     For today's appointment patient:    [x]  Cancelled    [] Rescheduled appointment    [] No-show     Reason given by patient:    []  Patient ill    []  Conflicting appointment    [] No transportation      [] Conflict with work    [] No reason given    [] Weather related    [] COVID-19    [x] Other:      Comments:  Meeting with his boss and HR this morning and unable to make it in.       [] Next appointment was confirmed    Electronically signed by: Og Roman, PTA

## 2024-05-14 ENCOUNTER — HOSPITAL ENCOUNTER (OUTPATIENT)
Dept: PHYSICAL THERAPY | Age: 54
Setting detail: THERAPIES SERIES
Discharge: HOME | End: 2024-05-14
Payer: COMMERCIAL

## 2024-05-14 PROCEDURE — 97032 APPL MODALITY 1+ESTIM EA 15: CPT

## 2024-05-14 PROCEDURE — 97140 MANUAL THERAPY 1/> REGIONS: CPT

## 2024-05-14 PROCEDURE — 97110 THERAPEUTIC EXERCISES: CPT

## 2024-05-14 NOTE — FLOWSHEET NOTE
[x] [facility]   Outpatient Rehabilitation & Therapy  [address]  P: [phone]   F: [phone]    Physical Therapy Daily Treatment Note    Date:  2024  Patient Name:  Reynold Mojica    :  1970  MRN: [id number]  Physician: Geronimo Gutiérrez MD (Addend 24, [facility] providers: Michelle Cintron MD, Sophie Reed MD)                            Insurance: Self Insured Work Related Injury: Employer: [employer]   Contact:  -Third Party Liability   Rashaun Haliemiguel Ridott  [address] [place] [address]  Latricia Ly in [phone]  Medical Diagnosis: M54.50 (ICD-10-CM) - Acute bilateral low back pain without sciatica (Addend 24: S39.012A Low back strain)  Rehab Codes: M54.59, M25.651. 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 Does not feel work is accommodating his restrictions. States he saw doc  and no change in restrictions- reports doctor offered looking into injections and MRI if no change in symptoms. Patient reports he would like to combine land therapy with aquatics to trial other modalities for pain relief.     Reports minimal communication with HR at work regarding his injury coverage.     Pain:  [x] Yes  [] No Location: left lower back- QL region- just above PSIS; denies radicular symptoms   Pain Rating: (0-10 scale) 6/10   Pain altered Tx:  [x] No  [] Yes  Action:    Comments:      Objective:  Date of Eval: 3/18/24                               Primary PT: David Rosales PT    Modalities:   Precautions:  Exercises:  Exercise Reps/ Time Weight/ Level Completed  Today Comments   Estim 10'  x Chronic pain, 2 pads, 41 mA          Manual        STM to lumbar paraspinals,

## 2024-05-24 ENCOUNTER — APPOINTMENT (OUTPATIENT)
Dept: PHYSICAL THERAPY | Age: 54
End: 2024-05-24
Payer: COMMERCIAL

## 2024-05-31 ENCOUNTER — HOSPITAL ENCOUNTER (OUTPATIENT)
Dept: PHYSICAL THERAPY | Age: 54
Setting detail: THERAPIES SERIES
End: 2024-05-31
Payer: COMMERCIAL

## 2024-06-10 ENCOUNTER — CLINICAL DOCUMENTATION (OUTPATIENT)
Dept: PHYSICAL THERAPY | Age: 54
End: 2024-06-10

## 2024-06-10 NOTE — THERAPY DISCHARGE
[x] [facility]  Rehabilitation Services  [address]  [place], [place] [address]  Phone [phone]  Fax [phone]    Physical Therapy Discharge Note    Date: 6/10/2024      Patient: Reynold Mojica  : 1970  MRN: [id number]    Physician: Geronimo Gutiérrez MD (Addend 24, [facility] providers: Michelle Cintron MD, Sophie Reed MD)                            Insurance: Self Insured Work Related Injury: Employer: [employer]   Contact:  -Third Party Liability   [employer]un Durant  [address] [place] [address]  Latricia Ly in [phone]  Medical Diagnosis: M54.50 (ICD-10-CM) - Acute bilateral low back pain without sciatica (Addend 24: S39.012A Low back strain)  Rehab Codes: M54.59, M25.651. M25.652, R29.3, M62.81  Onset Date: 3/4/24                   Total visits attended:8  Cancels/No shows:  Date of initial visit: 3/18/24                   [] Patient recovered from conditions. Treatment goals were met.  [] Patient received maximum benefit. No further therapy indicated at this time.  [] Patient demonstrated improvement from condition with  ** Of  ** Short term goals met.  []Patient demonstrated improvement from condition with **   Of **  Long term goals met.  [] Patient to continue exercise/home instructions independently.  [] Therapy interrupted due to:    [x] Patient has 2 or more no shows/cancels, is discontinued per our policy.    [] Patient has completed prescribed number of treatment sessions.    [x] Other: Patient has multiple no shows and canceled and has not returned calls. Patient is to be discharged at this time. If patient is to require more physical therapy services, they are to obtain new script from physician.         It Is My Understanding That The:  [] Patient returned to work.  [] Patient demonstrated improved level of function.  [] Patient returned to previous functional level.  [x] Patient's current functional

## 2024-06-12 ENCOUNTER — TELEPHONE (OUTPATIENT)
Dept: GASTROENTEROLOGY | Age: 54
End: 2024-06-12

## 2024-06-12 NOTE — TELEPHONE ENCOUNTER
Pt called in regarding appt he has today with Dr. Szymanski. States is not sure that he can make it in today due to his work schedule and is very unhappy that we are not able to offer any sooner appts than Aug with any other providers.     Wants to know why he has to wait months to get a appt and feels he should be able to get an appt within a few weeks.      Please call pt to asst with appt @ [phone]

## 2024-07-09 ENCOUNTER — OFFICE VISIT (OUTPATIENT)
Dept: GASTROENTEROLOGY | Age: 54
End: 2024-07-09
Payer: COMMERCIAL

## 2024-07-09 VITALS
DIASTOLIC BLOOD PRESSURE: 75 MMHG | WEIGHT: 193.6 LBS | RESPIRATION RATE: 20 BRPM | HEART RATE: 58 BPM | BODY MASS INDEX: 28.58 KG/M2 | TEMPERATURE: 97.2 F | SYSTOLIC BLOOD PRESSURE: 118 MMHG | OXYGEN SATURATION: 94 %

## 2024-07-09 DIAGNOSIS — Z86.010 HX OF COLONIC POLYP: ICD-10-CM

## 2024-07-09 DIAGNOSIS — R79.89 ELEVATED LFTS: Primary | ICD-10-CM

## 2024-07-09 DIAGNOSIS — K21.9 GASTROESOPHAGEAL REFLUX DISEASE, UNSPECIFIED WHETHER ESOPHAGITIS PRESENT: ICD-10-CM

## 2024-07-09 PROCEDURE — 99204 OFFICE O/P NEW MOD 45 MIN: CPT | Performed by: INTERNAL MEDICINE

## 2024-07-09 ASSESSMENT — ENCOUNTER SYMPTOMS
SORE THROAT: 0
VOICE CHANGE: 0
RECTAL PAIN: 0
NAUSEA: 0
VOMITING: 0
TROUBLE SWALLOWING: 0
ABDOMINAL PAIN: 0
COUGH: 0
ANAL BLEEDING: 0
DIARRHEA: 0
BLOOD IN STOOL: 0
WHEEZING: 0
ABDOMINAL DISTENTION: 0
CONSTIPATION: 0
CHOKING: 0

## 2024-07-09 NOTE — PROGRESS NOTES
CREATININE 0.9 09/19/2023    BILITOT 0.5 09/19/2023    ALKPHOS 77 09/19/2023    AST 40 (H) 09/19/2023    ALT 52 (H) 09/19/2023         Lab Results   Component Value Date    RBC 5.14 09/19/2023    HGB 15.4 09/19/2023    MCV 90.1 09/19/2023    MCH 30.0 09/19/2023    MCHC 33.3 09/19/2023    RDW 12.9 09/19/2023    MPV 10.8 09/19/2023    BASOPCT 0.4 04/05/2019    LYMPHSABS 2.0 04/05/2019    MONOSABS 0.5 04/05/2019    NEUTROABS 2.6 04/05/2019    EOSABS 0.1 04/05/2019    BASOSABS 0.10 10/17/2022         DIAGNOSTIC TESTING:     No results found.       PHYSICAL EXAMINATION: Vital signs reviewed per the nursing documentation.     /75   Pulse 58   Temp 97.2 °F (36.2 °C) (Tympanic)   Resp 20   Wt 87.8 kg (193 lb 9.6 oz)   SpO2 94%   BMI 28.58 kg/m²   Body mass index is 28.58 kg/m².   Physical Exam  Vitals and nursing note reviewed.   Constitutional:       General: He is not in acute distress.     Appearance: He is well-developed. He is not diaphoretic.   HENT:      Head: Normocephalic and atraumatic.   Eyes:      General: No scleral icterus.     Pupils: Pupils are equal, round, and reactive to light.   Neck:      Thyroid: No thyromegaly.      Vascular: No JVD.      Trachea: No tracheal deviation.   Cardiovascular:      Rate and Rhythm: Normal rate and regular rhythm.      Heart sounds: Normal heart sounds. No murmur heard.  Pulmonary:      Effort: Pulmonary effort is normal. No respiratory distress.      Breath sounds: Normal breath sounds. No wheezing.   Abdominal:      General: Bowel sounds are normal. There is no distension.      Palpations: Abdomen is soft. There is no mass.      Tenderness: There is no abdominal tenderness. There is no guarding or rebound.   Musculoskeletal:         General: No tenderness. Normal range of motion.      Cervical back: Normal range of motion and neck supple.   Skin:     General: Skin is warm.      Coloration: Skin is not pale.      Findings: No erythema or rash.      Comments: He

## 2024-07-23 ENCOUNTER — HOSPITAL ENCOUNTER (OUTPATIENT)
Age: 54
Discharge: HOME | End: 2024-07-23
Payer: COMMERCIAL

## 2024-07-23 DIAGNOSIS — R79.89 ELEVATED LFTS: ICD-10-CM

## 2024-07-23 LAB
HAV AB SERPL IA-ACNC: REACTIVE
HBV SURFACE AB SERPL IA-ACNC: <3.5 MIU/ML
HBV SURFACE AG SERPL QL IA: NONREACTIVE
HCV AB SERPL QL IA: NONREACTIVE
INR PPP: 1
IRON SATN MFR SERPL: 26 % (ref 20–55)
IRON SERPL-MCNC: 82 UG/DL (ref 61–157)
PROTHROMBIN TIME: 13.6 SEC (ref 11.8–14.6)
TIBC SERPL-MCNC: 317 UG/DL (ref 250–450)
UNSATURATED IRON BINDING CAPACITY: 235 UG/DL (ref 112–347)

## 2024-07-23 PROCEDURE — 36415 COLL VENOUS BLD VENIPUNCTURE: CPT

## 2024-07-23 PROCEDURE — 86225 DNA ANTIBODY NATIVE: CPT

## 2024-07-23 PROCEDURE — 87340 HEPATITIS B SURFACE AG IA: CPT

## 2024-07-23 PROCEDURE — 86708 HEPATITIS A ANTIBODY: CPT

## 2024-07-23 PROCEDURE — 82103 ALPHA-1-ANTITRYPSIN TOTAL: CPT

## 2024-07-23 PROCEDURE — 86038 ANTINUCLEAR ANTIBODIES: CPT

## 2024-07-23 PROCEDURE — 83550 IRON BINDING TEST: CPT

## 2024-07-23 PROCEDURE — 86803 HEPATITIS C AB TEST: CPT

## 2024-07-23 PROCEDURE — 83540 ASSAY OF IRON: CPT

## 2024-07-23 PROCEDURE — 83516 IMMUNOASSAY NONANTIBODY: CPT

## 2024-07-23 PROCEDURE — 85610 PROTHROMBIN TIME: CPT

## 2024-07-23 PROCEDURE — 86317 IMMUNOASSAY INFECTIOUS AGENT: CPT

## 2024-07-23 PROCEDURE — 82104 ALPHA-1-ANTITRYPSIN PHENO: CPT

## 2024-07-25 LAB
ANA SER QL IA: NEGATIVE
DSDNA IGG SER QL IA: 0.6 IU/ML
MITOCHONDRIA M2 IGG SER-ACNC: 1.3 U/ML (ref 0–4)
NUCLEAR IGG SER IA-RTO: 0.2 U/ML
SMOOTH MUSCLE ANTIBODY: 6 UNITS (ref 0–19)

## 2024-07-26 LAB
ALPHA-1 ANTITRYPSIN PHENOTYPE: NORMAL
ALPHA-1 ANTITRYPSIN: 144 MG/DL (ref 90–200)

## 2024-07-29 ENCOUNTER — TELEPHONE (OUTPATIENT)
Dept: GASTROENTEROLOGY | Age: 54
End: 2024-07-29

## 2024-08-19 ENCOUNTER — OFFICE VISIT (OUTPATIENT)
Dept: UROLOGY | Age: 54
End: 2024-08-19
Payer: COMMERCIAL

## 2024-08-19 VITALS
SYSTOLIC BLOOD PRESSURE: 120 MMHG | OXYGEN SATURATION: 97 % | BODY MASS INDEX: 28.58 KG/M2 | HEIGHT: 69 IN | WEIGHT: 193 LBS | DIASTOLIC BLOOD PRESSURE: 78 MMHG | TEMPERATURE: 98 F | HEART RATE: 86 BPM

## 2024-08-19 DIAGNOSIS — N20.1 LEFT URETERAL STONE: Primary | ICD-10-CM

## 2024-08-19 PROCEDURE — 99214 OFFICE O/P EST MOD 30 MIN: CPT | Performed by: UROLOGY

## 2024-08-19 ASSESSMENT — ENCOUNTER SYMPTOMS
COLOR CHANGE: 0
GASTROINTESTINAL NEGATIVE: 1
BACK PAIN: 0
RESPIRATORY NEGATIVE: 1
VOMITING: 0
ABDOMINAL PAIN: 0
EYE REDNESS: 0
EYE PAIN: 0
ALLERGIC/IMMUNOLOGIC NEGATIVE: 1
SHORTNESS OF BREATH: 0
NAUSEA: 0
COUGH: 0
EYES NEGATIVE: 1
WHEEZING: 0

## 2024-08-19 NOTE — PROGRESS NOTES
[facility] PHYSICIANS [facility] UROLOGY CENTER  [address]  [facility] [address]  Dept: [phone]    [facility] Urology Office Note - Established    Patient:  Reynold Mojica  YOB: 1970  Date: 8/19/2024    The patient is a 54 y.o. male who presents todayfor evaluation of the following problems:   Chief Complaint   Patient presents with    Ureteral stone       HPI  This is a 50-year-old gentleman who we have seen in the past for hydrocele.  He was in the emergency department last month with flank pain.  He was found to have a small distal left ureteral stone.  He was managed with observation and trial of passage.  He is better.  Although his flank pain has not completely resolved, he says it is 98% better.    Summary of old records: N/A    Additional History: N/A    Procedures Today: N/A    Urinalysis today:  No results found for this visit on 08/19/24.  Last several PSA's:  Lab Results   Component Value Date    PSA 1.01 09/19/2023    PSA 1.11 01/05/2022    PSA 1.94 02/24/2020     Last total testosterone:  Lab Results   Component Value Date    TESTOSTERONE 463 09/19/2023       AUA Symptom Score (8/19/2024):                               Last BUN and creatinine:  Lab Results   Component Value Date    BUN 14 09/19/2023     Lab Results   Component Value Date    CREATININE 0.9 09/19/2023       Additional Lab/Culture results: none    Imaging Reviewed during this Office Visit: none  (results were independently reviewed by physician and radiology report verified)    PAST MEDICAL, FAMILY AND SOCIAL HISTORY UPDATE:  Past Medical History:   Diagnosis Date    Bradycardia     patient is a runner    Colon polyp     GERD (gastroesophageal reflux disease)     Hiatal hernia     Hydrocele 03/2023    right    Hyperlipidemia     Under care of service provider 03/23/2023    pcp-marty saleh-last visit march 2023     Past Surgical History:   Procedure Laterality

## 2024-09-13 ENCOUNTER — HOSPITAL ENCOUNTER (OUTPATIENT)
Age: 54
Discharge: HOME | End: 2024-09-13
Payer: COMMERCIAL

## 2024-09-13 DIAGNOSIS — E78.2 MIXED HYPERLIPIDEMIA: ICD-10-CM

## 2024-09-13 DIAGNOSIS — R73.03 PREDIABETES: ICD-10-CM

## 2024-09-13 DIAGNOSIS — M79.662 PAIN OF LEFT CALF: ICD-10-CM

## 2024-09-13 LAB
CHOLEST SERPL-MCNC: 269 MG/DL
CHOLESTEROL/HDL RATIO: 6.6
D DIMER PPP FEU-MCNC: 0.3 UG/ML FEU (ref 0–0.59)
EST. AVERAGE GLUCOSE BLD GHB EST-MCNC: 120 MG/DL
HBA1C MFR BLD: 5.8 % (ref 4–6)
HDLC SERPL-MCNC: 41 MG/DL
LDLC SERPL CALC-MCNC: 180 MG/DL (ref 0–130)
TRIGL SERPL-MCNC: 239 MG/DL

## 2024-09-13 PROCEDURE — 85379 FIBRIN DEGRADATION QUANT: CPT

## 2024-09-13 PROCEDURE — 36415 COLL VENOUS BLD VENIPUNCTURE: CPT

## 2024-09-13 PROCEDURE — 83036 HEMOGLOBIN GLYCOSYLATED A1C: CPT

## 2024-09-13 PROCEDURE — 80061 LIPID PANEL: CPT

## 2024-09-23 ENCOUNTER — TELEPHONE (OUTPATIENT)
Dept: UROLOGY | Age: 54
End: 2024-09-23

## (undated) DEVICE — SUTURE CHROMIC GUT SZ 4-0 L18IN ABSRB BRN L19MM PS-2 3/8 1637G

## (undated) DEVICE — GAUZE,SPONGE,FLUFF,6"X6.75",STRL,5/TRAY: Brand: MEDLINE

## (undated) DEVICE — LOOP VES W13MM THK09MM MINI RED SIL FLD REPELLENT

## (undated) DEVICE — PREMIUM DRY TRAY LF: Brand: MEDLINE INDUSTRIES, INC.

## (undated) DEVICE — SUTURE VCRL SZ 3-0 L27IN ABSRB UD L26MM SH 1/2 CIR J416H

## (undated) DEVICE — INTENDED FOR TISSUE SEPARATION, AND OTHER PROCEDURES THAT REQUIRE A SHARP SURGICAL BLADE TO PUNCTURE OR CUT.: Brand: BARD-PARKER ® CARBON RIB-BACK BLADES

## (undated) DEVICE — SPEAR SURG TRIANG SHP HNDL PCH WECK-CEL

## (undated) DEVICE — TUBING, SUCTION, 9/32" X 20', STRAIGHT: Brand: MEDLINE INDUSTRIES, INC.

## (undated) DEVICE — YANKAUER,FLEXIBLE HANDLE,REGLR CAPACITY: Brand: MEDLINE INDUSTRIES, INC.

## (undated) DEVICE — GOWN,SIRUS,NONRNF,SETINSLV,XL,20/CS: Brand: MEDLINE

## (undated) DEVICE — GLOVE SURG SZ 65 THK91MIL LTX FREE SYN POLYISOPRENE

## (undated) DEVICE — DRAIN SURG PENROSE 0.25X18 IN CLOSED WND DRAINAGE PREM SIL

## (undated) DEVICE — SUTURE CHROMIC GUT SZ 4-0 L27IN ABSRB BRN L26MM SH 1/2 CIR G121H

## (undated) DEVICE — ATHLETIC SUPPORTER LATEX FREE, LARGE

## (undated) DEVICE — ADHESIVE SKIN CLOSURE TOP 36 CC HI VISC DERMBND MINI

## (undated) DEVICE — STRAP,POSITIONING,KNEE/BODY,FOAM,4X60": Brand: MEDLINE

## (undated) DEVICE — HYPODERMIC SAFETY NEEDLE: Brand: MAGELLAN

## (undated) DEVICE — GLOVE ORANGE PI 7   MSG9070

## (undated) DEVICE — BLADE CLIPPER GEN PURP NS

## (undated) DEVICE — SVMMC PEDS/UROLOGY MINOR PACK: Brand: MEDLINE INDUSTRIES, INC.

## (undated) DEVICE — ELECTRODE PT RET AD L9FT HI MOIST COND ADH HYDRGEL CORDED

## (undated) DEVICE — COVER LT HNDL BLU PLAS

## (undated) DEVICE — MITT SURG PREP L ADH DISPOSABLE

## (undated) DEVICE — GLOVE ORANGE PI 7 1/2   MSG9075

## (undated) DEVICE — SOLUTION SCRB 4OZ 4% CHG H2O AIDED FOR PREOPERATIVE SKIN